# Patient Record
Sex: FEMALE | Race: BLACK OR AFRICAN AMERICAN | NOT HISPANIC OR LATINO | Employment: UNEMPLOYED | URBAN - METROPOLITAN AREA
[De-identification: names, ages, dates, MRNs, and addresses within clinical notes are randomized per-mention and may not be internally consistent; named-entity substitution may affect disease eponyms.]

---

## 2017-08-07 ENCOUNTER — OFFICE VISIT - HEALTHEAST (OUTPATIENT)
Dept: INTERNAL MEDICINE | Facility: CLINIC | Age: 22
End: 2017-08-07

## 2017-08-07 ENCOUNTER — COMMUNICATION - HEALTHEAST (OUTPATIENT)
Dept: INTERNAL MEDICINE | Facility: CLINIC | Age: 22
End: 2017-08-07

## 2017-08-07 ENCOUNTER — COMMUNICATION - HEALTHEAST (OUTPATIENT)
Dept: TELEHEALTH | Facility: CLINIC | Age: 22
End: 2017-08-07

## 2017-08-07 DIAGNOSIS — E55.9 VITAMIN D DEFICIENCY: ICD-10-CM

## 2017-08-07 DIAGNOSIS — E11.9 TYPE 2 DIABETES MELLITUS (H): ICD-10-CM

## 2017-08-07 DIAGNOSIS — Z00.00 ANNUAL PHYSICAL EXAM: ICD-10-CM

## 2017-08-07 DIAGNOSIS — M79.672 FOOT PAIN, LEFT: ICD-10-CM

## 2017-08-07 DIAGNOSIS — Z11.3 SCREEN FOR STD (SEXUALLY TRANSMITTED DISEASE): ICD-10-CM

## 2017-08-07 LAB — HBA1C MFR BLD: 8.8 % (ref 3.5–6)

## 2017-08-07 ASSESSMENT — MIFFLIN-ST. JEOR: SCORE: 1962.55

## 2017-08-08 ENCOUNTER — COMMUNICATION - HEALTHEAST (OUTPATIENT)
Dept: INTERNAL MEDICINE | Facility: CLINIC | Age: 22
End: 2017-08-08

## 2017-08-08 LAB — HIV 1+2 AB+HIV1 P24 AG SERPL QL IA: NEGATIVE

## 2017-08-09 ENCOUNTER — COMMUNICATION - HEALTHEAST (OUTPATIENT)
Dept: INTERNAL MEDICINE | Facility: CLINIC | Age: 22
End: 2017-08-09

## 2017-08-09 LAB
HBV SURFACE AG SERPL QL IA: NEGATIVE
HCV AB SERPL QL IA: NEGATIVE
SYPHILIS RPR SCREEN - HISTORICAL: NORMAL

## 2017-08-10 ENCOUNTER — COMMUNICATION - HEALTHEAST (OUTPATIENT)
Dept: INTERNAL MEDICINE | Facility: CLINIC | Age: 22
End: 2017-08-10

## 2017-08-11 ENCOUNTER — COMMUNICATION - HEALTHEAST (OUTPATIENT)
Dept: INTERNAL MEDICINE | Facility: CLINIC | Age: 22
End: 2017-08-11

## 2017-08-11 DIAGNOSIS — E11.9 TYPE 2 DIABETES MELLITUS (H): ICD-10-CM

## 2017-08-14 ENCOUNTER — OFFICE VISIT - HEALTHEAST (OUTPATIENT)
Dept: EDUCATION SERVICES | Facility: CLINIC | Age: 22
End: 2017-08-14

## 2017-08-14 DIAGNOSIS — E11.9 TYPE 2 DIABETES MELLITUS (H): ICD-10-CM

## 2017-08-18 ENCOUNTER — AMBULATORY - HEALTHEAST (OUTPATIENT)
Dept: LAB | Facility: CLINIC | Age: 22
End: 2017-08-18

## 2017-08-18 DIAGNOSIS — E11.9 TYPE 2 DIABETES MELLITUS (H): ICD-10-CM

## 2017-08-18 LAB
CHOLEST SERPL-MCNC: 222 MG/DL
FASTING STATUS PATIENT QL REPORTED: YES
HDLC SERPL-MCNC: 45 MG/DL
LDLC SERPL CALC-MCNC: 155 MG/DL
TRIGL SERPL-MCNC: 109 MG/DL

## 2017-09-07 ENCOUNTER — OFFICE VISIT - HEALTHEAST (OUTPATIENT)
Dept: PODIATRY | Facility: CLINIC | Age: 22
End: 2017-09-07

## 2017-09-07 DIAGNOSIS — M79.672 LEFT FOOT PAIN: ICD-10-CM

## 2018-01-09 ENCOUNTER — AMBULATORY - HEALTHEAST (OUTPATIENT)
Dept: NURSING | Facility: CLINIC | Age: 23
End: 2018-01-09

## 2018-01-09 ENCOUNTER — COMMUNICATION - HEALTHEAST (OUTPATIENT)
Dept: INTERNAL MEDICINE | Facility: CLINIC | Age: 23
End: 2018-01-09

## 2018-01-09 DIAGNOSIS — Z11.1 ENCOUNTER FOR TUBERCULIN SKIN TEST: ICD-10-CM

## 2018-01-11 ENCOUNTER — AMBULATORY - HEALTHEAST (OUTPATIENT)
Dept: NURSING | Facility: CLINIC | Age: 23
End: 2018-01-11

## 2018-01-11 DIAGNOSIS — Z00.00 HEALTH CARE MAINTENANCE: ICD-10-CM

## 2018-01-11 LAB
INDURATION - HISTORICAL: 0 MM
TB SKIN TEST - HISTORICAL: NEGATIVE

## 2018-09-05 ENCOUNTER — RECORDS - HEALTHEAST (OUTPATIENT)
Dept: ADMINISTRATIVE | Facility: OTHER | Age: 23
End: 2018-09-05

## 2018-10-09 ENCOUNTER — COMMUNICATION - HEALTHEAST (OUTPATIENT)
Dept: INTERNAL MEDICINE | Facility: CLINIC | Age: 23
End: 2018-10-09

## 2018-10-09 ENCOUNTER — COMMUNICATION - HEALTHEAST (OUTPATIENT)
Dept: NURSING | Facility: CLINIC | Age: 23
End: 2018-10-09

## 2018-10-10 ENCOUNTER — COMMUNICATION - HEALTHEAST (OUTPATIENT)
Dept: INTERNAL MEDICINE | Facility: CLINIC | Age: 23
End: 2018-10-10

## 2018-10-11 ENCOUNTER — COMMUNICATION - HEALTHEAST (OUTPATIENT)
Dept: INTERNAL MEDICINE | Facility: CLINIC | Age: 23
End: 2018-10-11

## 2018-10-18 ENCOUNTER — COMMUNICATION - HEALTHEAST (OUTPATIENT)
Dept: INTERNAL MEDICINE | Facility: CLINIC | Age: 23
End: 2018-10-18

## 2018-10-18 ENCOUNTER — PRENATAL OFFICE VISIT - HEALTHEAST (OUTPATIENT)
Dept: FAMILY MEDICINE | Facility: CLINIC | Age: 23
End: 2018-10-18

## 2018-10-18 DIAGNOSIS — N92.6 ABNORMAL MENSES: ICD-10-CM

## 2018-10-18 DIAGNOSIS — Z32.01 PREGNANCY TEST POSITIVE: ICD-10-CM

## 2018-10-18 DIAGNOSIS — E11.9 TYPE 2 DIABETES MELLITUS (H): ICD-10-CM

## 2018-10-18 DIAGNOSIS — Z34.00 NORMAL PREGNANCY, FIRST: ICD-10-CM

## 2018-10-18 DIAGNOSIS — E55.9 VITAMIN D DEFICIENCY: ICD-10-CM

## 2018-10-18 LAB
ALBUMIN UR-MCNC: NEGATIVE MG/DL
AMPHETAMINES UR QL SCN: NORMAL
BARBITURATES UR QL: NORMAL
BASOPHILS # BLD AUTO: 0 THOU/UL (ref 0–0.2)
BASOPHILS NFR BLD AUTO: 1 % (ref 0–2)
BENZODIAZ UR QL: NORMAL
CANNABINOIDS UR QL SCN: NORMAL
COCAINE UR QL: NORMAL
COLLECTION METHOD: NORMAL
CREAT UR-MCNC: 326 MG/DL
EOSINOPHIL # BLD AUTO: 0.1 THOU/UL (ref 0–0.4)
EOSINOPHIL NFR BLD AUTO: 1 % (ref 0–6)
ERYTHROCYTE [DISTWIDTH] IN BLOOD BY AUTOMATED COUNT: 12.6 % (ref 11–14.5)
GLUCOSE UR STRIP-MCNC: NEGATIVE MG/DL
HBA1C MFR BLD: 7.2 % (ref 3.5–6)
HCG UR QL: POSITIVE
HCT VFR BLD AUTO: 40.2 % (ref 35–47)
HGB BLD-MCNC: 12.5 G/DL (ref 12–16)
HIV 1+2 AB+HIV1 P24 AG SERPL QL IA: NEGATIVE
KETONES UR STRIP-MCNC: NEGATIVE MG/DL
LEAD BLD-MCNC: NORMAL UG/DL
LEAD RETEST: NO
LYMPHOCYTES # BLD AUTO: 2.3 THOU/UL (ref 0.8–4.4)
LYMPHOCYTES NFR BLD AUTO: 40 % (ref 20–40)
MCH RBC QN AUTO: 28.7 PG (ref 27–34)
MCHC RBC AUTO-ENTMCNC: 31.1 G/DL (ref 32–36)
MCV RBC AUTO: 92 FL (ref 80–100)
MONOCYTES # BLD AUTO: 0.7 THOU/UL (ref 0–0.9)
MONOCYTES NFR BLD AUTO: 12 % (ref 2–10)
NEUTROPHILS # BLD AUTO: 2.7 THOU/UL (ref 2–7.7)
NEUTROPHILS NFR BLD AUTO: 47 % (ref 50–70)
OPIATES UR QL SCN: NORMAL
OXYCODONE UR QL: NORMAL
PCP UR QL SCN: NORMAL
PLATELET # BLD AUTO: 310 THOU/UL (ref 140–440)
PMV BLD AUTO: 10.4 FL (ref 8.5–12.5)
RBC # BLD AUTO: 4.35 MILL/UL (ref 3.8–5.4)
WBC: 5.8 THOU/UL (ref 4–11)

## 2018-10-18 ASSESSMENT — MIFFLIN-ST. JEOR: SCORE: 1889.41

## 2018-10-19 ENCOUNTER — COMMUNICATION - HEALTHEAST (OUTPATIENT)
Dept: INTERNAL MEDICINE | Facility: CLINIC | Age: 23
End: 2018-10-19

## 2018-10-19 DIAGNOSIS — E11.9 DIABETES MELLITUS, TYPE 2 (H): ICD-10-CM

## 2018-10-19 LAB
25(OH)D3 SERPL-MCNC: 8.5 NG/ML (ref 30–80)
25(OH)D3 SERPL-MCNC: 8.5 NG/ML (ref 30–80)
ABO/RH(D): NORMAL
ABORH REPEAT: NORMAL
ANTIBODY SCREEN: NEGATIVE
BACTERIA SPEC CULT: NO GROWTH
HBV SURFACE AG SERPL QL IA: NEGATIVE
RUBV IGG SERPL QL IA: POSITIVE
T PALLIDUM AB SER QL: NEGATIVE

## 2018-10-20 LAB — LEAD BLDV-MCNC: <2 UG/DL (ref 0–4.9)

## 2018-10-21 ENCOUNTER — AMBULATORY - HEALTHEAST (OUTPATIENT)
Dept: FAMILY MEDICINE | Facility: CLINIC | Age: 23
End: 2018-10-21

## 2018-10-21 DIAGNOSIS — E55.9 VITAMIN D DEFICIENCY: ICD-10-CM

## 2018-11-01 ENCOUNTER — HOSPITAL ENCOUNTER (OUTPATIENT)
Dept: ULTRASOUND IMAGING | Facility: CLINIC | Age: 23
Discharge: HOME | End: 2018-11-01
Attending: PHYSICIAN ASSISTANT

## 2018-11-01 DIAGNOSIS — Z34.00 NORMAL PREGNANCY, FIRST: ICD-10-CM

## 2018-11-01 DIAGNOSIS — Z32.01 PREGNANCY TEST POSITIVE: ICD-10-CM

## 2018-11-30 ENCOUNTER — RECORDS - HEALTHEAST (OUTPATIENT)
Dept: ADMINISTRATIVE | Facility: OTHER | Age: 23
End: 2018-11-30

## 2018-12-07 ENCOUNTER — RECORDS - HEALTHEAST (OUTPATIENT)
Dept: ADMINISTRATIVE | Facility: OTHER | Age: 23
End: 2018-12-07

## 2018-12-07 ENCOUNTER — TRANSFERRED RECORDS (OUTPATIENT)
Dept: HEALTH INFORMATION MANAGEMENT | Facility: CLINIC | Age: 23
End: 2018-12-07

## 2018-12-19 ENCOUNTER — TRANSFERRED RECORDS (OUTPATIENT)
Dept: HEALTH INFORMATION MANAGEMENT | Facility: CLINIC | Age: 23
End: 2018-12-19

## 2018-12-19 ENCOUNTER — RECORDS - HEALTHEAST (OUTPATIENT)
Dept: ADMINISTRATIVE | Facility: OTHER | Age: 23
End: 2018-12-19

## 2018-12-20 ENCOUNTER — TELEPHONE (OUTPATIENT)
Dept: ENDOCRINOLOGY | Facility: CLINIC | Age: 23
End: 2018-12-20

## 2018-12-20 ENCOUNTER — COMMUNICATION - HEALTHEAST (OUTPATIENT)
Dept: ADMINISTRATIVE | Facility: CLINIC | Age: 23
End: 2018-12-20

## 2018-12-20 ENCOUNTER — PATIENT OUTREACH (OUTPATIENT)
Dept: EDUCATION SERVICES | Facility: CLINIC | Age: 23
End: 2018-12-20

## 2018-12-20 NOTE — TELEPHONE ENCOUNTER
Diabetes Education Scheduling Outreach #1:  Pt declined to schedule.    Tess Quispe  [facility]  Diabetes and Nutrition Scheduling

## 2018-12-20 NOTE — TELEPHONE ENCOUNTER
Diabetes Education Scheduling Outreach #1:    Call to patient to schedule. Left message with phone number to call to schedule.    Plan for 2nd outreach attempt within 1 business day.    [facility]  Diabetes and Nutrition Scheduling

## 2018-12-21 NOTE — PROGRESS NOTES
Pt called fvoc back and scheduled on 12/27 at 10:30 am next week with Christine.     Will close encounter.     Destiney Alvarez RD LD CDE

## 2018-12-27 ENCOUNTER — ALLIED HEALTH/NURSE VISIT (OUTPATIENT)
Dept: EDUCATION SERVICES | Facility: CLINIC | Age: 23
End: 2018-12-27
Attending: OBSTETRICS & GYNECOLOGY
Payer: COMMERCIAL

## 2018-12-27 DIAGNOSIS — O24.419 GESTATIONAL DIABETES: Primary | ICD-10-CM

## 2018-12-27 PROCEDURE — G0108 DIAB MANAGE TRN  PER INDIV: HCPCS | Performed by: NUTRITIONIST

## 2018-12-27 NOTE — PATIENT INSTRUCTIONS
Test your blood sugar four times per day.   Fasting (before breakfast) and two hours after each meal.    Glucose goals during pregnancy:  Fasting: less than 95  Two hours after eating: less than 120     Watch portion sizes of carbohydrate foods  Aim for 30-45 grams at breakfast  45-60 grams at lunch and dinner  15-30 grams at snacks    You can use [employer] (free Qu[employer]ium miesha) when eating out or look up the information online.     You will be getting a call from Endocrinology to set up an appointment.

## 2018-12-27 NOTE — PROGRESS NOTES
Diabetes Self-Management Education & Support  Gestational Diabetes Self-Management Education & Support    SUBJECTIVE/OBJECTIVE:  Presents for education related to pregnancy complicated by type 2 diabetes.  Referral is from Jacquelin Rizzo NP and Yennifer Nunez MD at [facility].   Referral is not in the medical record at this time.  Patient reports estimated date of delivery is June 12. This is her first pregnancy.  Patient was diagnosed with prediabetes in middle school and with type 2 diabetes in high school. At various times she took metformin but stopped by her own choice, states she doesn't like to take medications. Her most recent A1C was 7.2% on October 18th. She is not taking any medications at this time.  Patient lives with her mother, father, 4 and 10 year old siblings.  She is not currently checking her glucose. States she was checking twice daily and that most readings were in the 130-140 range. She was not aware that glucose goals change during pregnancy.  Patient has not checked her glucose in two weeks. She does have a new meter and supplies but did not bring them to the appointment today.   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 She is on winter break currently. She is willing to start testing four times daily.   Patient reports that she did see a DM educator for the first time last year at [facility].  Patient reports that her blood pressure has been elevated at doctor appointments but within goal when she checks at home. Her next follow up with her OB is on January 11.   Patients pre pregnancy weight was 245 lbs. Her weight has varied a bit during this pregnancy between 245 and 256 lbs.     Cultural Influences/Ethnic Background:  Data Unavailable    Estimated Date of Delivery: Data Unavailable    1 hour OGTT  No results found for: GLU1    3 hour OGTT    Fasting  No results found for:  GLF    1 hour  No results found for: GL1    2 hour  No results found for: GL2    3 hour  No results found for: GL3    Lifestyle and Health Behaviors:  Patient is receiving WIC.  For breakfast she has a bowl of cereal - mini wheat or frosted flakes or berry cheerios with 1% milk. Occasionally she will have eggs, sausage or yogurt  She has snacks after each meal. Snacks include fruit snacks, eating a lot of pickles recently (we talked about blood glucose and sodium today - patient will cut back), occassionally chips, guacamole and crackers. She is not eating sweets - has no appetite for them lately.  Lunch is usually leftovers. Dinner is spaghetti with meatballs or chicken with vegetables or salad with boiled egg or shrimp meagan. She rarely eats out.  Beverages include water and occasional juice (patient will cut back on juice portions)     ASSESSMENT:  Patient with type 2 diabetes and pregnancy. Needs to see Endocrinology and needs DM education.     INTERVENTION:    Educational topics covered today:  Difference between type 1 and type 2 and GDM, Risks and Complications of uncontrolled glucose, Means of controlling glucose during pregnancy, Purpose of glucose testing, how often and when to test glucose, Blood Glucose Goals, Logging and Interpreting Glucose Results, bring meter to all appointments, When to Call a Diabetes Educator or OB Provider, Healthy Eating During Pregnancy, Counting Carbohydrates, Portion sizes, Meal Planning    Educational materials provided today:   [facility] Understanding Gestational Diabetes  Log Book  Carbohydrate counting     Pt verbalized understanding of concepts discussed and recommendations provided today.     PLAN:  See AVS  Time Spent: 60 minutes  Encounter Type: Individual    Any diabetes medication dose changes were made via the CDE Protocol and Collaborative Practice Agreement with the patient's referring provider. A copy of this encounter was shared with the provider.

## 2019-01-01 ENCOUNTER — AMBULATORY - HEALTHEAST (OUTPATIENT)
Dept: MULTI SPECIALTY CLINIC | Facility: CLINIC | Age: 24
End: 2019-01-01

## 2019-01-01 LAB — PAP SMEAR - HIM PATIENT REPORTED: NORMAL

## 2019-01-04 NOTE — PROGRESS NOTES
LVM again for patient to c/b to schedule urgent GDM appt with consult service next week with Abdoulaye. Pt no-showed to her diabetes ed with Christine yesterday.

## 2019-01-17 ENCOUNTER — ALLIED HEALTH/NURSE VISIT (OUTPATIENT)
Dept: EDUCATION SERVICES | Facility: CLINIC | Age: 24
End: 2019-01-17
Payer: COMMERCIAL

## 2019-01-17 VITALS — WEIGHT: 262 LBS

## 2019-01-17 DIAGNOSIS — E11.9 TYPE 2 DIABETES MELLITUS (H): Primary | ICD-10-CM

## 2019-01-17 NOTE — PROGRESS NOTES
Diabetes Self-Management Education & Support  Gestational Diabetes Self-Management Education & Support    SUBJECTIVE/OBJECTIVE:  Presents for education related to pregnancy complicated by type 2 diabetes.  Patient rescheduled her appointment for last week so was last seen two weeks ago.  She had not followed up with our Endocrinology coordinator regarding scheduling an appointment with the endocrinologist until this morning and now has an appointment scheduled with Dr. Heena Mesa.     Cultural Influences/Ethnic Background:  American    Wt 118.8 kg (262 lb)     Weight gain 6 lbs at 19 weeks gestation.    Estimated Date of Delivery: Data Unavailable    Blood Glucose/Ketone Log: Patient did not bring her meter to our last appointment and again did not bring her meter to this follow up today. States she was just coming from school and forgot it.  Is back in school now 8-4:30pm and reports that she has been able to test her glucose four times per day fasting and one hour after meals. Her fasting glucose per her report are ranging from 110-115 and her post prandial readings are all under 140. Reinforced the importance of bringing her meter to all appointments.     Lifestyle and Health Behaviors:  Patient lives with her parents and siblings. Her dad has type 2 diabetes.  She has cereal for breakfast (is portioning this out and hasn't had a problem with high glucose after). She will occasionally have a piece of fruit with that  Lunch has been leftovers. She has been packing it with her to bring to school.   Dinner is meat, vegetable and a portion of rice or chili with crackers. She's been craving ramen noodles lately so has been eating that as well (52 grams CHO, discussed adding protein and vegetable and limiting sodium packet)  Snacks include [place] granola bar or a yogurt.   She drinks mostly water and sometimes a little sweet tea - states not much because she knows it has sugar.  She feels the adjustments to  her diet are going well so far.      ASSESSMENT:  Unable to assess glucose readings as patient did not bring her meter, however, her reported fasting glucose readings are indicating that she will need insulin during this pregnancy.     INTERVENTION:  Educational topics covered today:  Reviewed meal planning.  Insulin injection technique taught using a Pen for NPH insulin. Patient verbalized understanding and was able to perform an accurate return demonstration of injection technique.  Discussed mixing insulin, storage, sharps, new needle each use, site selection, action of insulin and hypoglycemia signs, symptoms and treatment.    Ketone testing    PLAN:  Check glucose 4 times daily.  Check ketones once a week when readings are consistently negative.  Continue with recommended physical activity.  Continue to follow recommended meal plan.  See AVS for further Plan.    Call/e-mail/MyChart message diabetes educator if 3 or more blood sugars are above the goal in 1 week or if ketones are positive.    Time Spent: 60 minutes  Encounter Type: Individual    Any diabetes medication dose changes were made via the CDE Protocol and Collaborative Practice Agreement with the patient's referring provider. A copy of this encounter was shared with the provider.

## 2019-01-17 NOTE — PATIENT INSTRUCTIONS
"Taking Insulin:    1. Insulin dose will be determined by your doctor.    - Do a 2 unit \"prime\" before each injection, be sure a stream of insulin comes out of the needle before you give your injection.    - After you inject, hold the needle under the skin to the count of 10 to be sure all of the insulin goes in.     - Rotate injection sites, keeping at least 1 inch apart from last injection site and 2 inches away from belly button or surgical scars.    2. Pen - Use a new pen needle for each injection. Always remove pen needle from the insulin pen after use and do not store insulin pens with the needle on the pen.     3. Store insulin you are not using in the refrigerator (do not freeze). Take new insulin out of the refrigerator a few hours prior to use to bring to room temperature.     4. Once opened NPH Pens (Humulin N or Novolin N) can be kept at room temperature for 14 days after opened. Do not use the opened insulin after this time has passed, even if there is still medicine inside.     5. Always carry your blood sugar meter and a sugar source like glucose tablets with you in case of a low blood sugar. Treat a low blood sugar (less than 70) with 15 grams of carbohydrate (1 carb choice). Wait 15 minutes, recheck blood sugar. If blood sugar is still below 70, repeat the treatment.    6. Wear Medical ID or carry a wallet card stating you have Diabetes.    7. Call your doctor or diabetes educator if you begin having low blood sugars or if you have questions or concerns.     8. Follow-up: With the Endocrinologist on Wednesday. Be sure to bring your meter to that appointment! Appointment with me on 2/14 at 2:30pm.    Thank you! Here is a link to a video where you can watch insulin injections technique. [email or web address]    If you need a medication refill please contact your pharmacy. Please allow 3 business days for your refills to be completed.   "

## 2019-01-22 ENCOUNTER — DOCUMENTATION ONLY (OUTPATIENT)
Dept: CARE COORDINATION | Facility: CLINIC | Age: 24
End: 2019-01-22

## 2019-01-23 ENCOUNTER — OFFICE VISIT (OUTPATIENT)
Dept: ENDOCRINOLOGY | Facility: CLINIC | Age: 24
End: 2019-01-23
Payer: COMMERCIAL

## 2019-01-23 ENCOUNTER — TELEPHONE (OUTPATIENT)
Dept: ENDOCRINOLOGY | Facility: CLINIC | Age: 24
End: 2019-01-23

## 2019-01-23 ENCOUNTER — APPOINTMENT (OUTPATIENT)
Dept: LAB | Facility: CLINIC | Age: 24
End: 2019-01-23
Payer: COMMERCIAL

## 2019-01-23 VITALS
DIASTOLIC BLOOD PRESSURE: 79 MMHG | SYSTOLIC BLOOD PRESSURE: 124 MMHG | HEIGHT: 67 IN | BODY MASS INDEX: 41.4 KG/M2 | HEART RATE: 94 BPM | WEIGHT: 263.8 LBS

## 2019-01-23 DIAGNOSIS — E56.9 VITAMIN DEFICIENCY: Primary | ICD-10-CM

## 2019-01-23 DIAGNOSIS — E56.9 VITAMIN DEFICIENCY: ICD-10-CM

## 2019-01-23 DIAGNOSIS — O24.112 TYPE 2 DIABETES MELLITUS DURING PREGNANCY, SECOND TRIMESTER: ICD-10-CM

## 2019-01-23 DIAGNOSIS — E11.9 TYPE 2 DIABETES MELLITUS (H): ICD-10-CM

## 2019-01-23 LAB — HBA1C MFR BLD: 7 % (ref 4.3–6)

## 2019-01-23 RX ORDER — METFORMIN HCL 500 MG
TABLET, EXTENDED RELEASE 24 HR ORAL
Qty: 90 TABLET | Refills: 11 | Status: SHIPPED | OUTPATIENT
Start: 2019-01-23 | End: 2019-01-23

## 2019-01-23 RX ORDER — URINE ACETONE TEST STRIPS
STRIP MISCELLANEOUS
Qty: 1 EACH | Refills: 11 | Status: SHIPPED | OUTPATIENT
Start: 2019-01-23 | End: 2023-09-18

## 2019-01-23 RX ORDER — METFORMIN HCL 500 MG
TABLET, EXTENDED RELEASE 24 HR ORAL
Qty: 90 TABLET | Refills: 11 | Status: SHIPPED | OUTPATIENT
Start: 2019-01-23 | End: 2023-01-11

## 2019-01-23 RX ORDER — FOLIC ACID 1 MG/1
TABLET ORAL
COMMUNITY
Start: 2018-12-04 | End: 2023-10-30

## 2019-01-23 ASSESSMENT — MIFFLIN-ST. JEOR: SCORE: 1989.22

## 2019-01-23 ASSESSMENT — PAIN SCALES - GENERAL: PAINLEVEL: NO PAIN (0)

## 2019-01-23 NOTE — PATIENT INSTRUCTIONS
PLEASE GO TO LAB TODAY FOR VITAMIN D LEVEL    Please start taking vitamin D 2,000 international unit(s) daily in addition to your prenatal vitamin    During pregnancy:  Please check your blood sugars when you are fasting in the morning, and 1-2 hours after meals and before meals and log carefully into a notebook and bring the notebook to all of your visits.    During pregnancy:  Fasting blood sugar goals are <100  1 hours after eating blood sugar goals are: <140  2 hours after eating blood sugar goals are: <120    Please restart your metformin XR. Start 1 tab 1st week, then increase to 1 tab twice daily 2nd week, then increase to 2 tabs twice daily 3rd week.    Since your fasting blood sugars are higher than 100, you will need to start long acting basal insulin. Start glargine (lantus) insulin 10 units at bedtime and increase glargine (lantus/basal/long-acting insulin) by 5 units twice weekly until fasting blood sugars are <100. PLEASE ASSESS THE NEED TO INCREASE GLARGINE INSULIN AFTER YOUR METFORMIN IS AT THE MAX DOSE.    If your fasting blood sugars are <100 and if your blood sugars after eating are higher than the range (1 hour after eating >140 and 2 hours after eating >120), then you will need to add meal time insulin or novolog insulin starting with 1 unit per 1 unit of carbohydrates eaten. It is ok to wait up to 10-15 minutes after you have eaten to see how many carbohydrates you have eaten before dosing your meal time or humalog insulin. PLEASE ASSESS THE NEED TO INCREASE GLARGINE INSULIN AFTER YOUR METFORMIN IS AT THE MAX DOSE.    FOR FUTURE, IF NEEDED:  If your sugars are out of the range despite the above, start novolog insulin correction scale before meals:  Blood sugar less than 100 no insulin correction or bedtime insulin  Blood sugar 100 to 150, take 1 unit  Blood sugar 151 to 200, take 2 units  Blood sugar 201 to 250, take 3 units  Blood sugar 251 to 300, take 4 units  Blood sugar 301 to 350, take 5  units  Blood sugar 351-400, take 6 units     For questions about blood sugars, please call [phone] anytime & ask the  to page diabetes/endocrine on-call.

## 2019-01-23 NOTE — TELEPHONE ENCOUNTER
[facility]    Phone Message    May a detailed message be left on voicemail: yes    Reason for Call: Medication Question or concern regarding medication   Prescription Clarification  Name of Medication: metFORMIN (GLUCOPHAGE-XR) 500 MG 24 hr tablet  Prescribing Provider: Moshe    Pharmacy: [facility] - [place], [place] - [address] [place] [address]   What on the order needs clarification? Nicholas aragon pharmacist from [facility] called and would like to get clarification on the medication directions. Please call back Nicholas barr. Thanks.          Action Taken: Message routed to:  [facility] ([facility]): Felicity/Ping

## 2019-01-23 NOTE — PROGRESS NOTES
"Reason for visit/consult: T2DM during pregnancy    Primary care provider: No primary care provider on file.    HPI:  This is a 22 yo female  with Type 2 Diabetes Mellitus who is seen for evaluation and management of diabetes during 2nd trimester of pregnancy. She has had T2DM since 2016 and does not have any known complications. She was seen in ER at [facility] on 18 for pregnancy related nausea and vomiting and was given reglan. This has since resolved. She is taking not metformin or insulin or anything for her diabetes and her blood sugars are elevated. She has seen diabetes education last fall (see note in Care Everywhere). She is feeling ok today without complaints. Last A1c was 7.2% on 10/18/18. A1c today is 7.0 %. She saw diabetes education <1 week ago (see note from RD dated ) and reported her blood sugars were in target range. Insulin injection technique was taught at that visit.    LMP was 18 with recent US showing \"Single living intrauterine pregnancy at 7 weeks 3 days and EDC of 2019.\" She sees Jacquelin Rizzo NP and Yennifer Nunez MD at [facility]. Due date is 19. Patients pre pregnancy weight was 245 lbs and BMI was 40 prior to pregnancy. Her weight has varied a bit during this pregnancy between 245 and 256 lbs. She was on metformin XR prior to pregnancy.    Meter download data is as follows: 1/10-19  Highest: 536 (not hers - her stepdad used her meter from 1/10-19, then she began using the meter alone on 19)  Median: 260   Std. Dev.: 109   Lowest: 111   # tests: 25   % hypoglycemic: 0   % above target: 18     Past Medical/Surgical History:  -Type 2 DM 2016  -BUNIONECTOMY , 2015   x2     Allergies:  NKDA    Current Medications:  Prenatal vitamin    Family History:  No thyroid or adrenal  Step dad has T2DM and alcohol abuse, Mom used to have PCOS    Social History:   Single. Lives at home with mother, father, 4 and 10 year old siblings. 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 Denies drugs, alcohol, or tobacco.    ROS:  Negative     Exam  /79   Pulse 94   Ht 1.702 m (5' 7\")   Wt 119.7 kg (263 lb 12.8 oz)   BMI 41.32 kg/m    Gen: well appearing, nad, pleasant and conversant  HEENT: anicteric, EOMI, no proptosis or lid lag, no goiter or LAD    Labs/Imaging    Reviewed and remarkable for normal renal function and absence of anemia.  A1C 8.8 (H) 08/07/2017  A1C in 2015 was 7.0  Vitamin D was 8.5 on 10/19/18  TSH 2.46 on 8/8/17    Assessment and Plan    Type 2 DM with BMI 41 in 2nd trimester pregnancy, not on metFORMIN. Blood sugars are not in target range, therefore needs to restart metformin and insulin is recommended.    Continue diabetes education for information about CHO counting to prepare patient for insulin start later in pregnancy.    Vitamin D deficiency - was taking 50,000 international unit(s) weekly, now on nothing, except prenatal vitamin.    Labs today: vitamin D level    Discuss isocaloric diet in 3rd trimester at future visit after glucoses stablized.    Routine foot exam needed at future visit.    Patient Instructions:    PLEASE GO TO LAB TODAY FOR VITAMIN D LEVEL    Please start taking vitamin D 2,000 international unit(s) daily in addition to your prenatal vitamin    During pregnancy:  Please check your blood sugars when you are fasting in the morning, and 1-2 hours after meals and before meals and log carefully into a notebook and bring the notebook to all of your visits.    During pregnancy:  Fasting blood sugar goals are <100  1 hours after eating blood sugar goals are: <140  2 hours after eating blood sugar goals are: <120    Please restart your metformin XR. Start 1 tab 1st week, then increase to 1 tab twice daily 2nd week, then increase to 2 tabs twice daily 3rd week.    Since your fasting blood sugars are higher than 100, you will need to start long acting basal insulin. Start glargine (lantus) insulin " 10 units at bedtime and increase glargine (lantus/basal/long-acting insulin) by 5 units twice weekly until fasting blood sugars are <100. PLEASE ASSESS THE NEED TO INCREASE GLARGINE INSULIN AFTER YOUR METFORMIN IS AT THE MAX DOSE.    If your fasting blood sugars are <100 and if your blood sugars after eating are higher than the range (1 hour after eating >140 and 2 hours after eating >120), then you will need to add meal time insulin or novolog insulin starting with 1 unit per 1 unit of carbohydrates eaten. It is ok to wait up to 10-15 minutes after you have eaten to see how many carbohydrates you have eaten before dosing your meal time or humalog insulin. PLEASE ASSESS THE NEED TO INCREASE GLARGINE INSULIN AFTER YOUR METFORMIN IS AT THE MAX DOSE.    FOR FUTURE, IF NEEDED:  If your sugars are out of the range despite the above, start novolog insulin correction scale before meals:  Blood sugar less than 100 no insulin correction or bedtime insulin  Blood sugar 100 to 150, take 1 unit  Blood sugar 151 to 200, take 2 units  Blood sugar 201 to 250, take 3 units  Blood sugar 251 to 300, take 4 units  Blood sugar 301 to 350, take 5 units  Blood sugar 351-400, take 6 units     For questions about blood sugars, please call [phone] anytime & ask the  to page diabetes/endocrine on-call.    RTC: DIABETES EDUCATION on 2/14 & monthly with diabetes team member PA, 3 months endocrinologist    Heena Mesa MD, MPH  Attending Physician  Diabetes/Endocrinology/Metabolism    Time: 60 min spent on evaluation, management, counseling, education, & motivational interviewing with greater than 50% of the total time was spent on counseling and coordinating care

## 2019-01-23 NOTE — LETTER
"2019       RE: Ava Garcia  [address]  [place] [address]     Dear Colleague,    Thank you for referring your patient, Ava Garcia, to the [facility] ENDOCRINOLOGY at [facility]. Please see a copy of my visit note below.    Reason for visit/consult: T2DM during pregnancy    Primary care provider: No primary care provider on file.    HPI:  This is a 24 yo female  with Type 2 Diabetes Mellitus who is seen for evaluation and management of diabetes during 2nd trimester of pregnancy. She has had T2DM since 2016 and does not have any known complications. She was seen in ER at [facility] on 18 for pregnancy related nausea and vomiting and was given reglan. This has since resolved. She is taking not metformin or insulin or anything for her diabetes and her blood sugars are elevated. She has seen diabetes education last  (see note in Care Everywhere). She is feeling ok today without complaints. Last A1c was 7.2% on 10/18/18. A1c today is 7.0 %. She saw diabetes education <1 week ago (see note from RD dated ) and reported her blood sugars were in target range. Insulin injection technique was taught at that visit.    LMP was 18 with recent US showing \"Single living intrauterine pregnancy at 7 weeks 3 days and EDC of 2019.\" She sees Jacquelin Rizzo NP and Yennifer Nunez MD at [facility]. Due date is 19. Patients pre pregnancy weight was 245 lbs and BMI was 40 prior to pregnancy. Her weight has varied a bit during this pregnancy between 245 and 256 lbs. She was on metformin XR prior to pregnancy.    Meter download data is as follows: 1/10-19  Highest: 536 (not hers - her stepdad used her meter from 1/10-19, then she began using the meter alone on 19)  Median: 260   Std. Dev.: 109   Lowest: 111   # tests: 25   % hypoglycemic: 0   % above target: 18     Past Medical/Surgical History:  -Type 2 DM " "2016  -BUNIONECTOMY 2014, 2015   x2     Allergies:  NKDA    Current Medications:  Prenatal vitamin    Family History:  No thyroid or adrenal  Step dad has T2DM and alcohol abuse, Mom used to have PCOS    Social History:   Single. Lives at home with mother, father, 4 and 10 year old siblings. Attends [employer] for cos[employer]ology and working at [employer]. Denies drugs, alcohol, or tobacco.    ROS:  Negative     Exam  /79   Pulse 94   Ht 1.702 m (5' 7\")   Wt 119.7 kg (263 lb 12.8 oz)   BMI 41.32 kg/m     Gen: well appearing, nad, pleasant and conversant  HEENT: anicteric, EOMI, no proptosis or lid lag, no goiter or LAD    Labs/Imaging    Reviewed and remarkable for normal renal function and absence of anemia.  A1C 8.8 (H) 08/07/2017  A1C in 2015 was 7.0  Vitamin D was 8.5 on 10/19/18  TSH 2.46 on 8/8/17    Assessment and Plan    Type 2 DM with BMI 41 in 2nd trimester pregnancy, not on metFORMIN. Blood sugars are not in target range, therefore needs to restart metformin and insulin is recommended.    Continue diabetes education for information about CHO counting to prepare patient for insulin start later in pregnancy.    Vitamin D deficiency - was taking 50,000 international unit(s) weekly, now on nothing, except prenatal vitamin.    Labs today: vitamin D level    Discuss isocaloric diet in 3rd trimester at future visit after glucoses stablized.    Routine foot exam needed at future visit.    Patient Instructions:    PLEASE GO TO LAB TODAY FOR VITAMIN D LEVEL    Please start taking vitamin D 2,000 international unit(s) daily in addition to your prenatal vitamin    During pregnancy:  Please check your blood sugars when you are fasting in the morning, and 1-2 hours after meals and before meals and log carefully into a notebook and bring the notebook to all of your visits.    During pregnancy:  Fasting blood sugar goals are <100  1 hours after eating blood sugar goals are: <140  2 hours after eating blood " sugar goals are: <120    Please restart your metformin XR. Start 1 tab 1st week, then increase to 1 tab twice daily 2nd week, then increase to 2 tabs twice daily 3rd week.    Since your fasting blood sugars are higher than 100, you will need to start long acting basal insulin. Start glargine (lantus) insulin 10 units at bedtime and increase glargine (lantus/basal/long-acting insulin) by 5 units twice weekly until fasting blood sugars are <100. PLEASE ASSESS THE NEED TO INCREASE GLARGINE INSULIN AFTER YOUR METFORMIN IS AT THE MAX DOSE.    If your fasting blood sugars are <100 and if your blood sugars after eating are higher than the range (1 hour after eating >140 and 2 hours after eating >120), then you will need to add meal time insulin or novolog insulin starting with 1 unit per 1 unit of carbohydrates eaten. It is ok to wait up to 10-15 minutes after you have eaten to see how many carbohydrates you have eaten before dosing your meal time or humalog insulin. PLEASE ASSESS THE NEED TO INCREASE GLARGINE INSULIN AFTER YOUR METFORMIN IS AT THE MAX DOSE.    FOR FUTURE, IF NEEDED:  If your sugars are out of the range despite the above, start novolog insulin correction scale before meals:  Blood sugar less than 100 no insulin correction or bedtime insulin  Blood sugar 100 to 150, take 1 unit  Blood sugar 151 to 200, take 2 units  Blood sugar 201 to 250, take 3 units  Blood sugar 251 to 300, take 4 units  Blood sugar 301 to 350, take 5 units  Blood sugar 351-400, take 6 units     For questions about blood sugars, please call [phone] anytime & ask the  to page diabetes/endocrine on-call.    RTC: DIABETES EDUCATION on 2/14 & monthly with diabetes team member PA, 3 months endocrinologist    Heena Mesa MD, MPH  Attending Physician  Diabetes/Endocrinology/Metabolism    Time: 60 min spent on evaluation, management, counseling, education, & motivational interviewing with greater than 50% of the total  time was spent on counseling and coordinating care          Again, thank you for allowing me to participate in the care of your patient.      Sincerely,    Heena Mesa MD

## 2019-01-23 NOTE — LETTER
Date:January 24, 2019      Patient was self referred, no letter generated. Do not send.        [facility] Health Information

## 2019-01-24 LAB — DEPRECATED CALCIDIOL+CALCIFEROL SERPL-MC: 17 UG/L (ref 20–75)

## 2019-01-24 NOTE — TELEPHONE ENCOUNTER
Pharmacy Contact     Telephone Fax   [phone]   Pharmacy Address and Hours     Address Hours   [address]  [address]  [place] [address]      [facility] - [place], [place] - [address]     Spoke w/ Pharm D Martin regarding order for Metformin and orders to titrate dosage over 1-3 weeks related to possible GI upset and Pt tolerance.     metFORMIN (GLUCOPHAGE-XR) 500 MG 24 hr tablet 90 tablet 11 2019  No   Si tab daily x 1 week, then increase to 2 tabs  x 1 week, then increase to 2 tabs twice daily   Sent to pharmacy as: metFORMIN (GLUCOPHAGE-XR) 500 MG 24 hr tablet   Class: E-Prescribe   Order: [id number]   E-Prescribing Status: Receipt confirmed by pharmacy (2019  4:58 PM CST)

## 2019-01-24 NOTE — TELEPHONE ENCOUNTER
[facility] Call Center    Phone Message    May a detailed message be left on voicemail: yes    Reason for Call: Medication Question or concern regarding medication   Prescription Clarification  Name of Medication: Metformin  Prescribing Provider: Heena Mesa   Pharmacy: [facility] - [place], [place] - [address]   What on the order needs clarification? Pharmacy still needs clarification on dosing. Please CALL pharmacy to discuss.           Action Taken: Message routed to:  [facility] ([facility] Endo

## 2019-02-01 ENCOUNTER — TELEPHONE (OUTPATIENT)
Dept: ENDOCRINOLOGY | Facility: CLINIC | Age: 24
End: 2019-02-01

## 2019-02-01 NOTE — TELEPHONE ENCOUNTER
FRANCES called asking Question:  Has orders for metformin and insluin and hasn't taken anything yet, has just seen GYN Dr Yennifer Nunez and A1c hasn't really improved, fasting at 140 - type 2 - Dr is wondering how to facilitate helping this Pt - [phone]. States Pt doesn't really understand how to take medication. Sent to Dr Mesa for clarification.

## 2019-02-01 NOTE — TELEPHONE ENCOUNTER
[facility] Call Center    Phone Message    May a detailed message be left on voicemail: yes    Reason for Call: Other: Dr. Yennifer Nunez is requesting a call from either Dr. Mesa or a nurse in the clinic to discuss the pt's recent office visit. Please call her at [phone]. She is also requesting that office notes be faxed to [phone]. Thanks!     Action Taken: Message routed to:  [facility] ([facility] Endocrine

## 2019-02-04 ENCOUNTER — AMBULATORY - HEALTHEAST (OUTPATIENT)
Dept: MATERNAL FETAL MEDICINE | Facility: HOSPITAL | Age: 24
End: 2019-02-04

## 2019-02-04 ENCOUNTER — AMBULATORY - HEALTHEAST (OUTPATIENT)
Dept: ADMINISTRATIVE | Facility: CLINIC | Age: 24
End: 2019-02-04

## 2019-02-04 DIAGNOSIS — E11.9 DM2 (DIABETES MELLITUS, TYPE 2) (H): ICD-10-CM

## 2019-02-04 DIAGNOSIS — O26.90 PREGNANCY, ANTEPARTUM, COMPLICATIONS: ICD-10-CM

## 2019-02-11 DIAGNOSIS — O24.112 PRE-EXISTING TYPE 2 DIABETES MELLITUS DURING PREGNANCY IN SECOND TRIMESTER: Primary | ICD-10-CM

## 2019-02-12 ENCOUNTER — AMBULATORY - HEALTHEAST (OUTPATIENT)
Dept: MATERNAL FETAL MEDICINE | Facility: HOSPITAL | Age: 24
End: 2019-02-12

## 2019-02-14 ENCOUNTER — AMBULATORY - HEALTHEAST (OUTPATIENT)
Dept: CARDIOLOGY | Facility: HOSPITAL | Age: 24
End: 2019-02-14

## 2019-02-14 ENCOUNTER — OFFICE VISIT - HEALTHEAST (OUTPATIENT)
Dept: PODIATRY | Facility: CLINIC | Age: 24
End: 2019-02-14

## 2019-02-14 ENCOUNTER — AMBULATORY - HEALTHEAST (OUTPATIENT)
Dept: PODIATRY | Facility: CLINIC | Age: 24
End: 2019-02-14

## 2019-02-14 DIAGNOSIS — E11.9 TYPE 2 DIABETES MELLITUS WITHOUT COMPLICATION, WITHOUT LONG-TERM CURRENT USE OF INSULIN (H): ICD-10-CM

## 2019-02-14 DIAGNOSIS — R60.0 EDEMA OF LEFT FOOT: ICD-10-CM

## 2019-02-14 DIAGNOSIS — E11.9 DM2 (DIABETES MELLITUS, TYPE 2) (H): ICD-10-CM

## 2019-02-14 ASSESSMENT — MIFFLIN-ST. JEOR: SCORE: 1984.66

## 2019-02-15 ENCOUNTER — HEALTH MAINTENANCE LETTER (OUTPATIENT)
Age: 24
End: 2019-02-15

## 2019-02-18 ENCOUNTER — HOSPITAL ENCOUNTER (OUTPATIENT)
Dept: CARDIOLOGY | Facility: HOSPITAL | Age: 24
Discharge: HOME | End: 2019-02-18
Attending: OBSTETRICS & GYNECOLOGY

## 2019-02-18 DIAGNOSIS — E11.9 DM2 (DIABETES MELLITUS, TYPE 2) (H): ICD-10-CM

## 2019-02-18 LAB
ATRIAL RATE - MUSE: 86 BPM
DIASTOLIC BLOOD PRESSURE - MUSE: NORMAL MMHG
INTERPRETATION ECG - MUSE: NORMAL
P AXIS - MUSE: 43 DEGREES
PR INTERVAL - MUSE: 132 MS
QRS DURATION - MUSE: 80 MS
QT - MUSE: 364 MS
QTC - MUSE: 435 MS
R AXIS - MUSE: 52 DEGREES
SYSTOLIC BLOOD PRESSURE - MUSE: NORMAL MMHG
T AXIS - MUSE: 12 DEGREES
VENTRICULAR RATE- MUSE: 86 BPM

## 2019-02-19 ENCOUNTER — ALLIED HEALTH/NURSE VISIT (OUTPATIENT)
Dept: EDUCATION SERVICES | Facility: CLINIC | Age: 24
End: 2019-02-19
Payer: COMMERCIAL

## 2019-02-19 DIAGNOSIS — E56.9 VITAMIN DEFICIENCY: ICD-10-CM

## 2019-02-19 DIAGNOSIS — O24.419 GESTATIONAL DIABETES: Primary | ICD-10-CM

## 2019-02-20 VITALS — WEIGHT: 259.6 LBS | BODY MASS INDEX: 40.66 KG/M2

## 2019-02-20 NOTE — PROGRESS NOTES
"Diabetes Self-Management Education & Support  Gestational Diabetes Self-Management Education & Support    SUBJECTIVE/OBJECTIVE:  Presents for education related to gestational diabetes.  Currently at 23 weeks gestation.   Cultural Influences/Ethnic Background:  American  In school fulltime for cosmetology.      There were no vitals taken for this visit.    Weight gain 4 lbs at 23 weeks gestation.  She has lost 3 lbs since her last visit with a diabetes educator (one month ago).      Estimated Date of Delivery: June 17, 2019    Blood Glucose/Ketone Log: No blood glucoses available today because she did not bring her meter in with her.      Lifestyle and Health Behaviors:    States she is trying to follow dietary guidelines set out by MIGUEL ANGEL Aguilar at their last visit, but it is clear from talking to her that she isn't really counting carbs.      Current Management:  Taking 10 units of Lantus at bedtime.  She has not increased the dose per recommendations of Dr. Mesa.      ASSESSMENT:  Ketones: Hasn't been checking.   States that fasting blood glucoses are ranging between .  She thinks that her two post post prandial blood glucoses are in target range, but we checked her today about an hour after eating a \"honey bun\" and BG was 176.        INTERVENTION:  Educational topics covered today:  What to expect after delivery, Risk of GDM and planning ahead for future pregnancies, Recommended lifestyle interventions for reducing the risk of Type 2 Diabetes.    We discussed how to titrate up her dose of Lantus insulin, and discussed that she will more than likely require mealtime insulin as well.  We have no real blood glucoses to base any dosing on, however, so we did not add it today.  Discussed how to administer, take about 15 minutes before eating, need to count carbohydrates accurately.  Helped her download the [employer] miesha on her phone and gave her a new meter that will work with it.  Also helped her download " the [employer] phone miesha as well.        PLAN:  Check glucose 4 times daily.  Bring glucose meter with her to all diabetes-related appointments.    Check ketones once a week when readings are consistently negative.  Call if trace or higher for 2 days in a row.   Continue with recommended physical activity.  Continue to follow recommended meal plan: 30 carbs at breakfast, 45-60 carbs at lunch, 45-60 carbs at supper,  15-30 grams CHO for snacks, if desired, however this is not mandatory.   Follow consistent CHO meal plan, eat CHO and protein/fat at all meals/snacks.    Call/e-mail/MyChart message diabetes educator weekly until delivery.  Discussed in detail how she can do this:  Either email or MyChart message would be sufficient.      Time Spent: 60 minutes  Encounter Type: Individual    Any diabetes medication dose changes were made via the CDE Protocol and Collaborative Practice Agreement with the patient's referring provider. A copy of this encounter was shared with the provider.

## 2019-02-28 ENCOUNTER — DOCUMENTATION ONLY (OUTPATIENT)
Dept: CARE COORDINATION | Facility: CLINIC | Age: 24
End: 2019-02-28

## 2019-03-01 ENCOUNTER — PRE VISIT (OUTPATIENT)
Dept: MATERNAL FETAL MEDICINE | Facility: CLINIC | Age: 24
End: 2019-03-01

## 2019-03-01 ENCOUNTER — TRANSFERRED RECORDS (OUTPATIENT)
Dept: HEALTH INFORMATION MANAGEMENT | Facility: CLINIC | Age: 24
End: 2019-03-01

## 2019-03-01 ENCOUNTER — AMBULATORY - HEALTHEAST (OUTPATIENT)
Dept: MULTI SPECIALTY CLINIC | Facility: CLINIC | Age: 24
End: 2019-03-01

## 2019-03-01 LAB — RETINOPATHY: NORMAL

## 2019-03-01 NOTE — PROGRESS NOTES
RE: Ava Garcia  : 1995  MRUN: [id number]    2019    Dear Dr. Nunez,    Thank you for referring your patient Ms. Garcia for a Maternal-Fetal Medicine consultation today.  As you know, she is a 23 year old  at 25 weeks and 6 days gestation with an MARIA ANTONIA of 19 by LMP of 18.    She came to me today to discuss recommendations as she has pregestational Type II DM that has been poorly controlled.  She was initially diagnosed in  and reported that she had started on Metformin, but then discontinued medications until her current pregnancy. She was on Metformin at the beginning of the pregnancy, but due to fasting glucose elevations was switched to insulin. She last met with Endocrinology  and has been followed by Diabetic Education - her last visit in the chart is from , however she says that she was seen last week as well. She has currently been instructed to take 20u Lantus at bedtime and 5u Novalog with meals.  She did not have her meter with her - says she has been checking FS QID, but has not had values at her past visits with Diabetic educator either.  Her A1c was 7.2 on 10/18, most recently 7.0 on .    She has had a normal EKG. Has a normal fetal echo and ultrasound today (see imaging team).    Today she feels well.  She denies cramping, loss of fluid or bleeding. Is feeling baby move. Denies si/syx of preeclampsia (no headaches, RUQ pain, visual changes).     Problems Complicating Pregnancy:  Pregestational Diabetes  ?underlying CHTN    Obstetrical History:    Obstetric History       T0      L0     SAB0   TAB0   Ectopic0   Multiple0   Live Births0       # Outcome Date GA Lbr Jorge/2nd Weight Sex Delivery Anes PTL Lv   1 Current                   Medical History:   Type II DM  Question of chronic hypertension (mildly elevated BPs 140s/80 in MD office early in pregnancy, wnl at further prenatal visits)    Surgical History:   Bunionectomy ,  2015x2    Medications:     Current Outpatient Medications:      folic acid (FOLVITE) 1 MG tablet, , Disp: , Rfl:      insulin glargine (LANTUS SOLOSTAR PEN) 100 UNIT/ML pen, 15 units at bedtime to start with metformin, Disp: 15 mL, Rfl: 11     insulin pen needle (31G X 5 MM) 31G X 5 MM miscellaneous, Use 5 pen needles daily or as directed., Disp: 200 each, Rfl: 11     IRON PO, Take 1 tablet by mouth, Disp: , Rfl:      KETOSTIX test strip, Use as directed by diabetes education, Disp: 1 each, Rfl: 11     metFORMIN (GLUCOPHAGE-XR) 500 MG 24 hr tablet, 1 tab daily x 1 week, then increase to 2 tabs  x 1 week, then increase to 2 tabs twice daily, Disp: 90 tablet, Rfl: 11     Allergies:   No Known Allergies    Social History:    Currently works and goes to school    Minimal exercise currently - denies etoh/tob/drugs    Family History:     Ethnicity: AA    She denies contributory family history    Review of Systems:  Neg except for as noted in HPI    The remaining prenatal laboratory results are not available for the review during the consultation.    Ultrasound:  o See imaging tab for result of today's ultrasound    Physical examination was deferred at this time.    In light of the patient s history as listed above our conversation and recommendations can be summarized briefly as follows:     Diabetes Mellitus (Pregestational)    We discussed that women with diabetes have significantly increased incidence of adverse pregnancy outcome. Complications in pregnant women with diabetes include preeclampsia, spontaneous ,  term delivery, congenital malformation, stillbirth, macrosomia,  respiratory distress, hypoglycemia, and hyperbilirubinemia.  Women who are in poor glycemic control during the period of fetal organogenesis have a high incidence of spontaneous  and congenital anomalies. The incidence of these outcomes is directly related to the hemoglobin A1C (HbA1C) level.  Cardiac defects are  most commonly seen as well as renal, central nervous system, spinal and gastrointestinal anomalies.        We discussed the importance of good glycemic control throughout the pregnancy to prevent the above listed complications.  In addition, concurrent maternal medical disorders are associated with long-standing diabetes mellitus, especially: thyroid dysfunction, retinopathy, nephropathy, and vascular disease.  To better understand the risks of undergoing pregnancy, a baseline assessment of each of these organs is recommended.  She has had an EKG already in this pregnancy.       Obesity    Obesity during pregnancy is associated with hypertension, preeclampsia, diabetes, venous thromboembolism, fetal macrosomia,  delivery, and complications of  delivery (excessive blood loss, infection, wound separation).  Obesity is associated with fetal anomalies, specifically neural tube defects. Multiple studies suggest that pre-pregnancy obesity (BMI ? 30) increases the risk for stillbirth, though the etiology is not known.  We discussed the risks of obesity in our consultation today.        Recommendations:    Continued consultation with a diabetes educator    Initiation of a wholesome diet; often recommended is:   o 40-50% complex, high fiber carbohydrate  o 10-30% protein  o 20-35% unsaturated fats    Education regarding carbohydrate counting    Continue glucose testing with fasting and one - two hour postprandial throughout the pregnancy  o With goals of fasting levels below 95 mg/dL and postprandial levels below 140 mg/dL for 1 hr, 120 for 2 hr  o We discussed importance of bringing her meter with her to her visits for appropriate medication titration    Women should have home urine strips to assess for ketonuria when their serum glucose is > 200 mg/dL.  They should be instructed to immediately report any ketonuria because of the increased risk for DKA    We discussed initiation of low dose aspirin for  preeclampsia prophylaxis     Baseline laboratories  o Hemoglobin A1C, and every trimester thereafter (patient has these up to date)  o Thyroid stimulating hormone (TSH)  o Urine Pro/Cr ratio; this can be followed up with a 24-hour urine for total protein and creatinine clearance if needed  o Baseline liver function tests, platelet count, and serum creatinine   o Baseline EKG- patient has a normal EKG in her chart  o Ophthalmologic examination  o Urine cultures every trimester and aggressive treatment of bacteriuria      surveillance    Serial ultrasounds for fetal growth monthly starting at 24 weeks     fetal testing with twice weekly BPP at 32 weeks; if the patient is poorly controlled weekly testing should be initiated at 28 weeks and increased to twice weekly at 32 weeks     Delivery    If well-controlled, delivery at 39 & 0/7 - 39 & 6/7 weeks is recommended    For women with poorly controlled diabetes, a delivery before 39 weeks may be indicated     Maternal glucose should be maintained at  mg/dL during delivery; this can be done with an insulin drip and requires hourly finger stick assessment in active labor     Postpartum    Exclusive breastfeeding should be encouraged in women with DM given significant short-term and long-term benefits to both infant and mother    Usually insulin requirements immediately decrease to about one third to one half of the predelivery regimen    Future healthcare maintenance    Continue follow-up with her endocrinologist or primary care provider    Preconception counseling prior to any future pregnancies    Continue folic acid at least 400 mcg     Short-acting insulin should be stopped if NPO is anticipated due to risk for hypoglycemia    Prior to admission for surgery, NPH insulin may be given the evening before     Postpartum    Exclusive breastfeeding should be encouraged in women with DM given significant short-term and long-term benefits to both infant  and mother    Usually insulin requirements immediately decrease to about one third to one half of the predelivery regimen       At the end of our discussion, Ms. Garcia indicated that her questions were answered and she seemed satisfied with our discussion.  Thank you for the opportunity to participate in your patient s care.  If I can be of any further assistance, please do not hesitate to contact me.    I acted as a scribe for Dr. Marquez during today's visit.    Belle Brandt MD    Physician Attestation   I, Sameer Marquez, saw and evaluated Ava Garcia with the resident/fellow.      I have reviewed and discussed with Dr. Brandt the patient history, physical exam and plan of care. I personally reviewed the vital signs, medications, lab results and imaging.    Key history or physical exam findings: Type II DM diagnosed 2 years ago. Maternal obesity.    Key management decisions made: continue monitoring blood sugar and adjust insulin as indicated. Screen for renal or cardiac disease with PCR and EKG.    Sameer Marquez  Date of Service (when I saw the patient): 03/05/19    Time Spent on this Encounter   I, Sameer Marquez, spent a total of 30 minutes in face to face consultation today managing the care of Ava Garcia.  Over 50% of my time on the unit was spent counseling the patient and /or coordinating care regarding management of type II DM and obesity in pregnancy. See note for details.

## 2019-03-05 ENCOUNTER — HOSPITAL ENCOUNTER (OUTPATIENT)
Dept: ULTRASOUND IMAGING | Facility: CLINIC | Age: 24
End: 2019-03-05
Payer: COMMERCIAL

## 2019-03-05 ENCOUNTER — OFFICE VISIT (OUTPATIENT)
Dept: MATERNAL FETAL MEDICINE | Facility: CLINIC | Age: 24
End: 2019-03-05
Payer: COMMERCIAL

## 2019-03-05 ENCOUNTER — HOSPITAL ENCOUNTER (OUTPATIENT)
Dept: CARDIOLOGY | Facility: CLINIC | Age: 24
Discharge: HOME | End: 2019-03-05
Payer: COMMERCIAL

## 2019-03-05 DIAGNOSIS — O99.212 MATERNAL OBESITY SYNDROME IN SECOND TRIMESTER: Primary | ICD-10-CM

## 2019-03-05 DIAGNOSIS — O24.112 PRE-EXISTING TYPE 2 DIABETES MELLITUS DURING PREGNANCY IN SECOND TRIMESTER: ICD-10-CM

## 2019-03-05 PROCEDURE — G0463 HOSPITAL OUTPT CLINIC VISIT: HCPCS | Mod: 25

## 2019-03-05 PROCEDURE — 76811 OB US DETAILED SNGL FETUS: CPT

## 2019-03-05 PROCEDURE — 76825 ECHO EXAM OF FETAL HEART: CPT

## 2019-03-05 NOTE — PROGRESS NOTES
Fetal Cardiology Consultation    Patient:  Ava Garcia MRN:  [id number]   YOB: 1995 Age:  23 year old   Date of Visit:  3/5/2019 PCP:  Nay Ref-Primary, Physician   MARIA ANTONIA: 6/12/19 EGA: 25+6 weeks     Dear Dr. Nunez:    I had the pleasure of seeing Ava Garcia at the [facility] in [place] on 3/5/2019 in consultation for fetal echocardiography results. She presented today by herself. As you know, she is a 23 year old female with T2DM.    The fetal echocardiogram was normal. Normal fetal cardiac anatomy. Normal right and left ventricular size and function without hypertrophy. No evidence of diastolic dysfunction. No pericardial effusion. No arrhythmia.     I reviewed and interpreted the fetal echocardiogram today. I discussed the normal results with Ms. Garcia. While these results are normal, it is important to note that fetal echocardiography cannot exclude small atrial or ventricular septal defects, persistent ductus arteriosus, mild coarctation of the aorta, partial anomalous pulmonary venous return, minor anatomic valve anomalies, or coronary artery anomalies.     Thank you for allowing me to participate in Ms. Garcia's care. Please don't hesitate to contact me or the Fetal Cardiology team at [facility] with any questions or concerns.     I spent a total of 10 minutes face-to-face with Ms. Garcia during today's office visit. Over 50% of this time was spent counseling the patient and/or coordinating care regarding the fetal echocardiography results.     Steven Ro  Pediatric Cardiology  [facility]  Phone [phone]

## 2019-03-05 NOTE — PROGRESS NOTES
Pt presents to [facility] for assessment and evaluation of her pregnancy due to diabetic type 2 uncontrolled on insulin. Pt had us done and reviewed by Dr. Marquez. See epic for today's findings. Pt met with Dr. Brandt and Dr. Marquez for consult. See epic for consult discussion and recommendations. Pt questions answered. Will return in 3-4 weeks for follow up growth and start bpp's at 32 weeks. Pt states +fm, no lof or bleeding. Will follow up with diabetic ed for insulin concerns. Mela Philip RN

## 2019-03-07 ENCOUNTER — CARE COORDINATION (OUTPATIENT)
Dept: EDUCATION SERVICES | Facility: CLINIC | Age: 24
End: 2019-03-07

## 2019-03-07 NOTE — PROGRESS NOTES
Diabetes Educator Note:    Sent my chart message to patient to please share blood glucose results (previously requested).  She was instructed last Friday to increase her Lantus insulin to 25 units and to add Novolog 5 units at each meal.  She had texted me her numbers, which are all over target:  Fasting 116 to 138, and one hour post meals of 145 to 180.  Asked her to check in this past Monday, but haven't heard anything.  Note that she did make it to her U/S appointment on Tuesday.  She was last seen in Diabetes Education on Feb 19.  She has last seen by endocrinology Jan 23.  She has a follow up appointment with Carline Flowers on March 28.      Am waiting for a response.       INDIO PowellN, RN, CDE  Diabetes   [facility]  [facility] Room [address] [place] [address]  Email:  [email or web address]  Phone:  [phone]

## 2019-04-10 ENCOUNTER — HOSPITAL ENCOUNTER (OUTPATIENT)
Dept: ULTRASOUND IMAGING | Facility: CLINIC | Age: 24
Discharge: HOME | End: 2019-04-10
Attending: OBSTETRICS & GYNECOLOGY | Admitting: OBSTETRICS & GYNECOLOGY
Payer: COMMERCIAL

## 2019-04-10 ENCOUNTER — OFFICE VISIT (OUTPATIENT)
Dept: MATERNAL FETAL MEDICINE | Facility: CLINIC | Age: 24
End: 2019-04-10
Attending: OBSTETRICS & GYNECOLOGY
Payer: COMMERCIAL

## 2019-04-10 DIAGNOSIS — O24.112 PRE-EXISTING TYPE 2 DIABETES MELLITUS DURING PREGNANCY IN SECOND TRIMESTER: Primary | ICD-10-CM

## 2019-04-10 DIAGNOSIS — O24.112 PRE-EXISTING TYPE 2 DIABETES MELLITUS DURING PREGNANCY IN SECOND TRIMESTER: ICD-10-CM

## 2019-04-10 PROCEDURE — 76816 OB US FOLLOW-UP PER FETUS: CPT

## 2019-04-10 NOTE — PROGRESS NOTES
"Please see \"Imaging\" tab under \"Chart Review\" for details of today's US.    Emmie Hoffman, DO    "

## 2019-04-12 ENCOUNTER — AMBULATORY - HEALTHEAST (OUTPATIENT)
Dept: MATERNAL FETAL MEDICINE | Facility: HOSPITAL | Age: 24
End: 2019-04-12

## 2019-04-12 DIAGNOSIS — O26.90 PREGNANCY, ANTEPARTUM, COMPLICATIONS: ICD-10-CM

## 2019-04-19 ENCOUNTER — OFFICE VISIT - HEALTHEAST (OUTPATIENT)
Dept: MATERNAL FETAL MEDICINE | Facility: HOSPITAL | Age: 24
End: 2019-04-19

## 2019-04-19 ENCOUNTER — RECORDS - HEALTHEAST (OUTPATIENT)
Dept: ULTRASOUND IMAGING | Facility: HOSPITAL | Age: 24
End: 2019-04-19

## 2019-04-19 ENCOUNTER — RECORDS - HEALTHEAST (OUTPATIENT)
Dept: ADMINISTRATIVE | Facility: OTHER | Age: 24
End: 2019-04-19

## 2019-04-19 DIAGNOSIS — O26.90 PREGNANCY RELATED CONDITIONS, UNSPECIFIED, UNSPECIFIED TRIMESTER: ICD-10-CM

## 2019-04-19 DIAGNOSIS — O24.119 PRE-EXISTING TYPE 2 DIABETES MELLITUS DURING PREGNANCY, ANTEPARTUM: ICD-10-CM

## 2019-04-22 ENCOUNTER — RECORDS - HEALTHEAST (OUTPATIENT)
Dept: ADMINISTRATIVE | Facility: OTHER | Age: 24
End: 2019-04-22

## 2019-04-22 ENCOUNTER — RECORDS - HEALTHEAST (OUTPATIENT)
Dept: ULTRASOUND IMAGING | Facility: HOSPITAL | Age: 24
End: 2019-04-22

## 2019-04-22 ENCOUNTER — OFFICE VISIT - HEALTHEAST (OUTPATIENT)
Dept: MATERNAL FETAL MEDICINE | Facility: HOSPITAL | Age: 24
End: 2019-04-22

## 2019-04-22 DIAGNOSIS — O26.90 PREGNANCY RELATED CONDITIONS, UNSPECIFIED, UNSPECIFIED TRIMESTER: ICD-10-CM

## 2019-04-22 DIAGNOSIS — O24.119 PRE-EXISTING TYPE 2 DIABETES MELLITUS DURING PREGNANCY, ANTEPARTUM: ICD-10-CM

## 2019-04-23 ENCOUNTER — AMBULATORY - HEALTHEAST (OUTPATIENT)
Dept: MATERNAL FETAL MEDICINE | Facility: HOSPITAL | Age: 24
End: 2019-04-23

## 2019-05-15 ENCOUNTER — OFFICE VISIT - HEALTHEAST (OUTPATIENT)
Dept: SURGERY | Facility: CLINIC | Age: 24
End: 2019-05-15

## 2019-05-15 DIAGNOSIS — K81.9 CHOLECYSTITIS: ICD-10-CM

## 2019-05-22 ASSESSMENT — MIFFLIN-ST. JEOR: SCORE: 1943.84

## 2019-05-23 ENCOUNTER — OFFICE VISIT - HEALTHEAST (OUTPATIENT)
Dept: FAMILY MEDICINE | Facility: CLINIC | Age: 24
End: 2019-05-23

## 2019-05-23 DIAGNOSIS — Z01.818 PRE-OP EXAMINATION: ICD-10-CM

## 2019-05-23 DIAGNOSIS — E66.01 MORBID OBESITY (H): ICD-10-CM

## 2019-05-23 DIAGNOSIS — E11.9 TYPE 2 DIABETES MELLITUS WITHOUT COMPLICATION, WITHOUT LONG-TERM CURRENT USE OF INSULIN (H): ICD-10-CM

## 2019-05-23 LAB
FASTING STATUS PATIENT QL REPORTED: NO
GLUCOSE BLD-MCNC: 163 MG/DL (ref 74–125)
HBA1C MFR BLD: 7.5 % (ref 3.5–6)
HCG UR QL: NEGATIVE
HGB BLD-MCNC: 12.9 G/DL (ref 12–16)

## 2019-05-23 ASSESSMENT — MIFFLIN-ST. JEOR: SCORE: 1965.38

## 2019-05-24 ENCOUNTER — ANESTHESIA - HEALTHEAST (OUTPATIENT)
Dept: SURGERY | Facility: AMBULATORY SURGERY CENTER | Age: 24
End: 2019-05-24

## 2019-05-28 ENCOUNTER — SURGERY - HEALTHEAST (OUTPATIENT)
Dept: SURGERY | Facility: AMBULATORY SURGERY CENTER | Age: 24
End: 2019-05-28

## 2019-07-16 ENCOUNTER — OFFICE VISIT - HEALTHEAST (OUTPATIENT)
Dept: FAMILY MEDICINE | Facility: CLINIC | Age: 24
End: 2019-07-16

## 2019-07-16 ENCOUNTER — RECORDS - HEALTHEAST (OUTPATIENT)
Dept: ADMINISTRATIVE | Facility: OTHER | Age: 24
End: 2019-07-16

## 2019-07-16 DIAGNOSIS — Z30.017 NEXPLANON INSERTION: ICD-10-CM

## 2019-07-16 LAB — HCG UR QL: NEGATIVE

## 2019-07-16 ASSESSMENT — MIFFLIN-ST. JEOR: SCORE: 1988.93

## 2019-08-20 ENCOUNTER — OFFICE VISIT - HEALTHEAST (OUTPATIENT)
Dept: FAMILY MEDICINE | Facility: CLINIC | Age: 24
End: 2019-08-20

## 2019-08-20 ENCOUNTER — COMMUNICATION - HEALTHEAST (OUTPATIENT)
Dept: FAMILY MEDICINE | Facility: CLINIC | Age: 24
End: 2019-08-20

## 2019-08-20 DIAGNOSIS — N89.8 VAGINAL DISCHARGE: ICD-10-CM

## 2019-08-20 DIAGNOSIS — A59.9 TRICHOMONAS INFECTION: ICD-10-CM

## 2019-08-20 LAB
CLUE CELLS: ABNORMAL
TRICHOMONAS, WET PREP: ABNORMAL
YEAST, WET PREP: ABNORMAL

## 2019-08-21 LAB
C TRACH DNA SPEC QL PROBE+SIG AMP: NEGATIVE
N GONORRHOEA DNA SPEC QL NAA+PROBE: NEGATIVE

## 2019-09-17 ENCOUNTER — COMMUNICATION - HEALTHEAST (OUTPATIENT)
Dept: TELEHEALTH | Facility: CLINIC | Age: 24
End: 2019-09-17

## 2019-10-23 ENCOUNTER — COMMUNICATION - HEALTHEAST (OUTPATIENT)
Dept: SCHEDULING | Facility: CLINIC | Age: 24
End: 2019-10-23

## 2019-10-23 ENCOUNTER — OFFICE VISIT - HEALTHEAST (OUTPATIENT)
Dept: FAMILY MEDICINE | Facility: CLINIC | Age: 24
End: 2019-10-23

## 2019-10-23 DIAGNOSIS — H10.9 BACTERIAL CONJUNCTIVITIS: ICD-10-CM

## 2019-11-14 ENCOUNTER — COMMUNICATION - HEALTHEAST (OUTPATIENT)
Dept: FAMILY MEDICINE | Facility: CLINIC | Age: 24
End: 2019-11-14

## 2019-11-14 ENCOUNTER — OFFICE VISIT - HEALTHEAST (OUTPATIENT)
Dept: FAMILY MEDICINE | Facility: CLINIC | Age: 24
End: 2019-11-14

## 2019-11-14 DIAGNOSIS — E11.9 TYPE 2 DIABETES MELLITUS WITHOUT COMPLICATION, WITHOUT LONG-TERM CURRENT USE OF INSULIN (H): ICD-10-CM

## 2019-11-14 DIAGNOSIS — N93.8 DYSFUNCTIONAL UTERINE BLEEDING: ICD-10-CM

## 2019-11-14 DIAGNOSIS — E55.9 VITAMIN D DEFICIENCY: ICD-10-CM

## 2019-11-14 DIAGNOSIS — Z23 NEED FOR INFLUENZA VACCINATION: ICD-10-CM

## 2019-11-14 DIAGNOSIS — R30.9 PAINFUL URINATION: ICD-10-CM

## 2019-11-14 DIAGNOSIS — Z71.85 VACCINE COUNSELING: ICD-10-CM

## 2019-11-14 DIAGNOSIS — A59.9 TRICHOMONAS INFECTION: ICD-10-CM

## 2019-11-14 LAB
ALBUMIN UR-MCNC: ABNORMAL MG/DL
APPEARANCE UR: ABNORMAL
BACTERIA #/AREA URNS HPF: ABNORMAL HPF
BILIRUB UR QL STRIP: NEGATIVE
CLUE CELLS: ABNORMAL
COLOR UR AUTO: YELLOW
CREAT UR-MCNC: 113.6 MG/DL
GLUCOSE UR STRIP-MCNC: ABNORMAL MG/DL
HBA1C MFR BLD: 13.2 % (ref 3.5–6)
HGB UR QL STRIP: ABNORMAL
HIV 1+2 AB+HIV1 P24 AG SERPL QL IA: NEGATIVE
KETONES UR STRIP-MCNC: NEGATIVE MG/DL
LDLC SERPL CALC-MCNC: 170 MG/DL
LEUKOCYTE ESTERASE UR QL STRIP: ABNORMAL
MICROALBUMIN UR-MCNC: 6.75 MG/DL (ref 0–1.99)
MICROALBUMIN/CREAT UR: 59.4 MG/G
MUCOUS THREADS #/AREA URNS LPF: ABNORMAL LPF
NITRATE UR QL: NEGATIVE
PH UR STRIP: 6 [PH] (ref 5–8)
RBC #/AREA URNS AUTO: ABNORMAL HPF
SP GR UR STRIP: 1.01 (ref 1–1.03)
SQUAMOUS #/AREA URNS AUTO: ABNORMAL LPF
TRICHOMONAS #/AREA URNS HPF: PRESENT /[HPF]
TRICHOMONAS, WET PREP: ABNORMAL
TSH SERPL DL<=0.005 MIU/L-ACNC: 1.78 UIU/ML (ref 0.3–5)
UROBILINOGEN UR STRIP-ACNC: ABNORMAL
WBC #/AREA URNS AUTO: ABNORMAL HPF
YEAST, WET PREP: ABNORMAL

## 2019-11-15 LAB
25(OH)D3 SERPL-MCNC: 11.6 NG/ML (ref 30–80)
C TRACH DNA SPEC QL PROBE+SIG AMP: NEGATIVE
HCV AB SERPL QL IA: NEGATIVE
N GONORRHOEA DNA SPEC QL NAA+PROBE: NEGATIVE
T PALLIDUM AB SER QL: NEGATIVE

## 2019-11-16 LAB — BACTERIA SPEC CULT: ABNORMAL

## 2019-11-18 ENCOUNTER — COMMUNICATION - HEALTHEAST (OUTPATIENT)
Dept: FAMILY MEDICINE | Facility: CLINIC | Age: 24
End: 2019-11-18

## 2019-11-18 DIAGNOSIS — N30.00 ACUTE CYSTITIS WITHOUT HEMATURIA: ICD-10-CM

## 2019-12-16 ENCOUNTER — OFFICE VISIT - HEALTHEAST (OUTPATIENT)
Dept: FAMILY MEDICINE | Facility: CLINIC | Age: 24
End: 2019-12-16

## 2019-12-16 DIAGNOSIS — E11.9 TYPE 2 DIABETES MELLITUS WITHOUT COMPLICATION, WITHOUT LONG-TERM CURRENT USE OF INSULIN (H): ICD-10-CM

## 2019-12-16 DIAGNOSIS — E55.9 VITAMIN D DEFICIENCY: ICD-10-CM

## 2019-12-16 DIAGNOSIS — A59.9 TRICHOMONAS INFECTION: ICD-10-CM

## 2019-12-16 DIAGNOSIS — N39.0 URINARY TRACT INFECTION WITHOUT HEMATURIA, SITE UNSPECIFIED: ICD-10-CM

## 2019-12-16 DIAGNOSIS — B37.31 YEAST INFECTION OF THE VAGINA: ICD-10-CM

## 2019-12-16 LAB
CLUE CELLS: ABNORMAL
TRICHOMONAS, WET PREP: ABNORMAL
YEAST, WET PREP: ABNORMAL

## 2019-12-17 LAB
C TRACH DNA SPEC QL PROBE+SIG AMP: NEGATIVE
N GONORRHOEA DNA SPEC QL NAA+PROBE: NEGATIVE

## 2019-12-20 ENCOUNTER — AMBULATORY - HEALTHEAST (OUTPATIENT)
Dept: FAMILY MEDICINE | Facility: CLINIC | Age: 24
End: 2019-12-20

## 2019-12-20 DIAGNOSIS — A59.9 TRICHOMONAS INFECTION: ICD-10-CM

## 2019-12-22 ENCOUNTER — COMMUNICATION - HEALTHEAST (OUTPATIENT)
Dept: FAMILY MEDICINE | Facility: CLINIC | Age: 24
End: 2019-12-22

## 2019-12-22 ENCOUNTER — RECORDS - HEALTHEAST (OUTPATIENT)
Dept: FAMILY MEDICINE | Facility: CLINIC | Age: 24
End: 2019-12-22

## 2019-12-22 DIAGNOSIS — Z20.2 TRICHOMONAS EXPOSURE: ICD-10-CM

## 2020-01-01 ENCOUNTER — AMBULATORY - HEALTHEAST (OUTPATIENT)
Dept: MULTI SPECIALTY CLINIC | Facility: CLINIC | Age: 25
End: 2020-01-01

## 2020-01-01 LAB — RETINOPATHY: NORMAL

## 2020-02-05 ENCOUNTER — COMMUNICATION - HEALTHEAST (OUTPATIENT)
Dept: PHARMACY | Facility: CLINIC | Age: 25
End: 2020-02-05

## 2020-02-05 DIAGNOSIS — N93.8 DYSFUNCTIONAL UTERINE BLEEDING: ICD-10-CM

## 2020-02-12 ENCOUNTER — OFFICE VISIT - HEALTHEAST (OUTPATIENT)
Dept: FAMILY MEDICINE | Facility: CLINIC | Age: 25
End: 2020-02-12

## 2020-02-12 DIAGNOSIS — E11.9 TYPE 2 DIABETES MELLITUS WITHOUT COMPLICATION, WITHOUT LONG-TERM CURRENT USE OF INSULIN (H): ICD-10-CM

## 2020-02-12 DIAGNOSIS — Z11.3 SCREEN FOR STD (SEXUALLY TRANSMITTED DISEASE): ICD-10-CM

## 2020-02-12 DIAGNOSIS — E66.01 MORBID OBESITY (H): ICD-10-CM

## 2020-02-12 DIAGNOSIS — A59.9 TRICHOMONAS INFECTION: ICD-10-CM

## 2020-02-12 LAB
CLUE CELLS: ABNORMAL
HIV 1+2 AB+HIV1 P24 AG SERPL QL IA: NEGATIVE
TRICHOMONAS, WET PREP: ABNORMAL
YEAST, WET PREP: ABNORMAL

## 2020-02-13 ENCOUNTER — COMMUNICATION - HEALTHEAST (OUTPATIENT)
Dept: FAMILY MEDICINE | Facility: CLINIC | Age: 25
End: 2020-02-13

## 2020-02-13 LAB
C TRACH DNA SPEC QL PROBE+SIG AMP: NEGATIVE
N GONORRHOEA DNA SPEC QL NAA+PROBE: NEGATIVE
T PALLIDUM AB SER QL: NEGATIVE

## 2020-03-02 ENCOUNTER — COMMUNICATION - HEALTHEAST (OUTPATIENT)
Dept: FAMILY MEDICINE | Facility: CLINIC | Age: 25
End: 2020-03-02

## 2020-03-11 ENCOUNTER — HEALTH MAINTENANCE LETTER (OUTPATIENT)
Age: 25
End: 2020-03-11

## 2020-05-10 ENCOUNTER — COMMUNICATION - HEALTHEAST (OUTPATIENT)
Dept: FAMILY MEDICINE | Facility: CLINIC | Age: 25
End: 2020-05-10

## 2020-05-10 DIAGNOSIS — A59.9 TRICHOMONAS INFECTION: ICD-10-CM

## 2020-07-15 ENCOUNTER — COMMUNICATION - HEALTHEAST (OUTPATIENT)
Dept: FAMILY MEDICINE | Facility: CLINIC | Age: 25
End: 2020-07-15

## 2020-08-17 ENCOUNTER — OFFICE VISIT - HEALTHEAST (OUTPATIENT)
Dept: FAMILY MEDICINE | Facility: CLINIC | Age: 25
End: 2020-08-17

## 2020-08-17 DIAGNOSIS — N89.8 VAGINAL IRRITATION: ICD-10-CM

## 2020-08-17 DIAGNOSIS — E11.9 TYPE 2 DIABETES MELLITUS WITHOUT COMPLICATION, WITHOUT LONG-TERM CURRENT USE OF INSULIN (H): ICD-10-CM

## 2020-08-17 DIAGNOSIS — R39.89 POSSIBLE URINARY TRACT INFECTION: ICD-10-CM

## 2020-08-17 DIAGNOSIS — A59.9 TRICHOMONAS INFECTION: ICD-10-CM

## 2020-08-17 LAB
ALBUMIN UR-MCNC: NEGATIVE MG/DL
ANION GAP SERPL CALCULATED.3IONS-SCNC: 12 MMOL/L (ref 5–18)
APPEARANCE UR: CLEAR
BACTERIA #/AREA URNS HPF: ABNORMAL HPF
BILIRUB UR QL STRIP: NEGATIVE
BUN SERPL-MCNC: 8 MG/DL (ref 8–22)
CALCIUM SERPL-MCNC: 9.9 MG/DL (ref 8.5–10.5)
CHLORIDE BLD-SCNC: 103 MMOL/L (ref 98–107)
CLUE CELLS: ABNORMAL
CO2 SERPL-SCNC: 23 MMOL/L (ref 22–31)
COLOR UR AUTO: YELLOW
CREAT SERPL-MCNC: 0.83 MG/DL (ref 0.6–1.1)
GFR SERPL CREATININE-BSD FRML MDRD: >60 ML/MIN/1.73M2
GLUCOSE BLD-MCNC: 263 MG/DL (ref 70–125)
GLUCOSE UR STRIP-MCNC: ABNORMAL MG/DL
HBA1C MFR BLD: 11.3 %
HGB UR QL STRIP: ABNORMAL
KETONES UR STRIP-MCNC: NEGATIVE MG/DL
LDLC SERPL CALC-MCNC: 183 MG/DL
LEUKOCYTE ESTERASE UR QL STRIP: ABNORMAL
NITRATE UR QL: NEGATIVE
PH UR STRIP: 6 [PH] (ref 5–8)
POTASSIUM BLD-SCNC: 4.1 MMOL/L (ref 3.5–5)
RBC #/AREA URNS AUTO: ABNORMAL HPF
SODIUM SERPL-SCNC: 138 MMOL/L (ref 136–145)
SP GR UR STRIP: 1.02 (ref 1–1.03)
SQUAMOUS #/AREA URNS AUTO: ABNORMAL LPF
TRICHOMONAS, WET PREP: ABNORMAL
UROBILINOGEN UR STRIP-ACNC: ABNORMAL
WBC #/AREA URNS AUTO: ABNORMAL HPF
YEAST, WET PREP: ABNORMAL

## 2020-08-17 ASSESSMENT — MIFFLIN-ST. JEOR: SCORE: 1959.71

## 2020-08-18 LAB — BACTERIA SPEC CULT: NORMAL

## 2020-08-19 ENCOUNTER — AMBULATORY - HEALTHEAST (OUTPATIENT)
Dept: FAMILY MEDICINE | Facility: CLINIC | Age: 25
End: 2020-08-19

## 2020-08-19 DIAGNOSIS — A74.9 CHLAMYDIA INFECTION: ICD-10-CM

## 2020-08-19 LAB
C TRACH DNA SPEC QL PROBE+SIG AMP: POSITIVE
N GONORRHOEA DNA SPEC QL NAA+PROBE: NEGATIVE

## 2020-08-25 ENCOUNTER — OFFICE VISIT - HEALTHEAST (OUTPATIENT)
Dept: EDUCATION SERVICES | Facility: CLINIC | Age: 25
End: 2020-08-25

## 2020-08-25 DIAGNOSIS — E11.9 TYPE 2 DIABETES MELLITUS WITHOUT COMPLICATION, WITHOUT LONG-TERM CURRENT USE OF INSULIN (H): ICD-10-CM

## 2020-09-15 ENCOUNTER — OFFICE VISIT - HEALTHEAST (OUTPATIENT)
Dept: EDUCATION SERVICES | Facility: CLINIC | Age: 25
End: 2020-09-15

## 2020-09-28 ENCOUNTER — COMMUNICATION - HEALTHEAST (OUTPATIENT)
Dept: FAMILY MEDICINE | Facility: CLINIC | Age: 25
End: 2020-09-28

## 2020-09-28 DIAGNOSIS — E11.9 TYPE 2 DIABETES MELLITUS WITHOUT COMPLICATION, WITHOUT LONG-TERM CURRENT USE OF INSULIN (H): ICD-10-CM

## 2020-11-05 ENCOUNTER — OFFICE VISIT - HEALTHEAST (OUTPATIENT)
Dept: FAMILY MEDICINE | Facility: CLINIC | Age: 25
End: 2020-11-05

## 2020-11-05 DIAGNOSIS — B37.31 YEAST INFECTION OF THE VAGINA: ICD-10-CM

## 2020-11-05 DIAGNOSIS — N89.8 VAGINAL IRRITATION: ICD-10-CM

## 2020-11-05 DIAGNOSIS — E11.9 TYPE 2 DIABETES MELLITUS WITHOUT COMPLICATION, WITHOUT LONG-TERM CURRENT USE OF INSULIN (H): ICD-10-CM

## 2020-11-05 LAB
CLUE CELLS: ABNORMAL
CREAT UR-MCNC: 64.5 MG/DL
HBA1C MFR BLD: >14 %
MICROALBUMIN UR-MCNC: 1.23 MG/DL (ref 0–1.99)
MICROALBUMIN/CREAT UR: 19.1 MG/G
TRICHOMONAS, WET PREP: ABNORMAL
YEAST, WET PREP: ABNORMAL

## 2020-11-06 LAB
C TRACH DNA SPEC QL PROBE+SIG AMP: NEGATIVE
N GONORRHOEA DNA SPEC QL NAA+PROBE: NEGATIVE

## 2021-01-03 ENCOUNTER — COMMUNICATION - HEALTHEAST (OUTPATIENT)
Dept: FAMILY MEDICINE | Facility: CLINIC | Age: 26
End: 2021-01-03

## 2021-01-03 ENCOUNTER — HEALTH MAINTENANCE LETTER (OUTPATIENT)
Age: 26
End: 2021-01-03

## 2021-01-03 DIAGNOSIS — B37.31 YEAST INFECTION OF THE VAGINA: ICD-10-CM

## 2021-01-15 ENCOUNTER — AMBULATORY - HEALTHEAST (OUTPATIENT)
Dept: MULTI SPECIALTY CLINIC | Facility: CLINIC | Age: 26
End: 2021-01-15

## 2021-01-15 LAB — RETINOPATHY: NORMAL

## 2021-01-21 ENCOUNTER — COMMUNICATION - HEALTHEAST (OUTPATIENT)
Dept: FAMILY MEDICINE | Facility: CLINIC | Age: 26
End: 2021-01-21

## 2021-01-21 ENCOUNTER — OFFICE VISIT - HEALTHEAST (OUTPATIENT)
Dept: FAMILY MEDICINE | Facility: CLINIC | Age: 26
End: 2021-01-21

## 2021-01-21 DIAGNOSIS — Z11.3 SCREEN FOR STD (SEXUALLY TRANSMITTED DISEASE): ICD-10-CM

## 2021-01-21 DIAGNOSIS — E11.9 TYPE 2 DIABETES MELLITUS WITHOUT COMPLICATION, WITHOUT LONG-TERM CURRENT USE OF INSULIN (H): ICD-10-CM

## 2021-01-21 LAB
CLUE CELLS: NORMAL
HBA1C MFR BLD: 13.2 %
HIV 1+2 AB+HIV1 P24 AG SERPL QL IA: NEGATIVE
TRICHOMONAS, WET PREP: NORMAL
YEAST, WET PREP: NORMAL

## 2021-05-01 ENCOUNTER — HEALTH MAINTENANCE LETTER (OUTPATIENT)
Age: 26
End: 2021-05-01

## 2021-05-28 NOTE — PROGRESS NOTES
MFM received call from [facility].  Patient instructed to have follow-up ultrasounds done with them. Cancelled all future MFM appointments.    Stephanie Nielsen

## 2021-05-28 NOTE — PROGRESS NOTES
"Please see \"Imaging\" tab under Chart Review for full report.  This ultrasound was performed in the [facility], and may be located under Care Everywhere.    Cheri Anderson MD  Maternal Fetal Medicine    "

## 2021-05-28 NOTE — PROGRESS NOTES
I was consulted by Mela Head DO, to evaluate this patients gall bladder.    HPI: Ava Garcia is a 23 y.o. female who has been experiencing some problems with epigastric abdominal pain which brought her into the [facility] ER.  She is 2 months post partum.  . she has been noting this for about 1 week.  It is not associated with nausea or vomiting.  US with stones,  The CT showed a distended gall bladder      Allergies:Patient has no known allergies.    No past medical history on file.    Past Surgical History:   Procedure Laterality Date     BUNIONECTOMY  2014, 2015    x2       CURRENT MEDS:    Current Outpatient Medications:      cholecalciferol, vitamin D3, 50,000 unit Tab, Take 1 tablet by mouth once a week., Disp: 12 tablet, Rfl: 3     ECONTRA EZ tablet, , Disp: , Rfl:      folic acid (FOLVITE) 1 MG tablet, , Disp: , Rfl:      ibuprofen (ADVIL,MOTRIN) 600 MG tablet, , Disp: , Rfl:      metFORMIN (GLUCOPHAGE-XR) 500 MG 24 hr tablet, , Disp: , Rfl:      prenatal vit-iron fum-folic ac (PRENATAL VITAMIN) 27 mg iron- 0.8 mg Tab, Take 1 tablet by mouth once daily., Disp: 100 tablet, Rfl: 3     XULANE 150-35 mcg/24 hr, , Disp: , Rfl:     Family History   Problem Relation Age of Onset     Polycystic ovary syndrome Mother      Alcohol abuse Father      No Medical Problems Sister      No Medical Problems Brother         reports that she has never smoked. She has never used smokeless tobacco. She reports that she drank alcohol. She reports that she does not use drugs.    Review of Systems:  10 system were reviewed and all are within normal limits except for those listed in the HPI.      EXAM:  /70 (Patient Site: Right Arm, Patient Position: Sitting, Cuff Size: Adult Large)   Pulse 81   Wt (!) 257 lb (116.6 kg)   LMP 09/05/2018 (Exact Date)   SpO2 98%   BMI 40.25 kg/m    GENERAL: Well developed female   EYES: Anicteric Sclera,  EOMI  CARDIAC: RRR w/out murmur  CHEST/LUNG: Clear to ascultation, No  wheezes  ABDOMEN: Soft with, +Bowel Sounds  NEURO:No focal deficits, ambulatory  LYMPH: No Axillary or inguinal Adenopathy  EXTREMITIES: Ambulatory, No lower extremity deformities      LABS:  Lab Results   Component Value Date    WBC 7.1 05/12/2019    HGB 12.2 05/12/2019    HCT 39.3 05/12/2019    MCV 88 05/12/2019     05/12/2019     INR/Prothrombin Time  Results from last 7 days   Lab Units 05/12/19 2008   LN-SODIUM mmol/L 142   LN-POTASSIUM mmol/L 4.1   LN-CHLORIDE mmol/L 105   LN-CO2 mmol/L 27   LN-BLOOD UREA NITROGEN mg/dL 10   LN-CREATININE mg/dL 0.81   LN-CALCIUM mg/dL 9.6     Lab Results   Component Value Date    ALT 67 (H) 05/12/2019     (H) 05/12/2019    ALKPHOS 171 (H) 05/12/2019    BILITOT 0.3 05/12/2019       IMAGES:   I reviewed the US and see the presence of Gall Stones    Assessment/Plan: Pt with signs and symptoms consistent with chronic cholecystitis. I have recommended a cholecystectomy. I discussed with her the plan to do this laparoscopically understanding the possibility of needing to convert to an open operation. I went over some of the risks of surgery including but not limited to bleeding, infection and bile duct injury. I also discussed the outpatient nature of the surgery and the expected recovery time.         Cristobal Lipscomb MD  [phone]  [facility] Department of Surgery

## 2021-05-28 NOTE — PROGRESS NOTES
"Please see \"Imaging\" tab under Chart Review for full report.  This ultrasound was performed in the [facility], and may be located under Care Everywhere.    Cheri Anderson MD  Maternal Fetal Medicine    "

## 2021-05-29 NOTE — PROGRESS NOTES
[facility] PRE-OPERATIVE VISIT NOTE    Ava Garcia,  1995    Upcoming surgery date: 19  Surgeon: Dr. Lipscomb  Surgery Facility: [facility]    Procedure: Cholecystectomy, laparoscopic    SUBJECTIVE:  History of Present Illness:   Ava Garcia is a 23 y.o. female with right upper quadrant pain and cholelithiasis.  She has had 2 ER visits this month for right upper quadrant pain.  First ER visit was on 2019, second was on 2019.  I reviewed both ER notes, all labs done at ER visits, and both ultrasounds.  Lab work-up was grossly normal, with the exception of mildly elevated liver function tests.  Ultrasound on 2019 showed cholelithiasis.  Patient saw surgeon and she is scheduled for cholecystectomy next week.  I do not have the surgery consult note available to me today.  She is avoiding fried and fatty foods.  No acute concerns today.  She is about 3 weeks postpartum.  She was treated for diabetes during pregnancy.  She has since stopped metformin.    Patient Active Problem List   Diagnosis     Type 2 diabetes mellitus (H)     Vitamin D deficiency         Current Outpatient Medications:      cholecalciferol, vitamin D3, 50,000 unit Tab, Take 1 tablet by mouth once a week., Disp: 12 tablet, Rfl: 3     oxyCODONE-acetaminophen (PERCOCET/ENDOCET) 5-325 mg per tablet, Take 1 tablet by mouth every 6 (six) hours as needed., Disp: 13 tablet, Rfl: 0     prenatal vit-iron fum-folic ac (PRENATAL VITAMIN) 27 mg iron- 0.8 mg Tab, Take 1 tablet by mouth once daily., Disp: 100 tablet, Rfl: 3    Past Medical History:   Diagnosis Date     Diabetes mellitus (H)     Gestational       Past Surgical History:   Procedure Laterality Date     BUNIONECTOMY  , 2015    x2       History of bleeding: none  History of anesthesia reactions: None    she  reports that she has never smoked. She has never used smokeless tobacco. She reports that she drank alcohol. She reports that she does  not use drugs.    Family History   Problem Relation Age of Onset     Polycystic ovary syndrome Mother      Alcohol abuse Father      No Medical Problems Sister      No Medical Problems Brother        Review of Systems:  Constitution: normal  HEENT: normal  Pulmonary: normal  Cardiovascular: normal  GI: normal  : normal  Musculoskeletal: normal  Neuro: normal  Endocrine: normal  Psych: normal  Lymph/Heme: normal    OBJECTIVE:    Allergies:  Patient has no known allergies.    Vitals:    05/23/19 1315   BP: 112/82   Pulse: 80   Resp: 16   SpO2: 98%     Body mass index is 40.84 kg/m .    Physical Exam:  Vital signs reviewed  General:          Patient is Alert and oriented x 3, in no apparent distress  Head:   Normocephalic, without obvious abnormality, atraumatic  Eyes:   PERRL, conjunctiva/corneas clear, EOMs intact  ENT:   Normal tympanic membranes and external ear canals, no erythema or exudate in throat  Neck:    No adenopathy, normal ROM  Cardiac:  Regular, normal S1 and S2, no murmur, gallop or rub  Lungs:    Clear to auscultation bilaterally, respirations unlabored  Abdomen:  Soft, non-tender, normal bowel sounds, no masses, no organomegaly  Back:    ROM normal  Extremities:   Extremities normal, atraumatic, no cyanosis or edema  Skin:     Skin color, texture, turgor normal, no rashes or lesions  Lymph nodes:   Cervical nodes normal  Neurologic:   CNII-XII intact.   DTRs normal and symmetric.  Symmetric strength and sensation.    Recent Results (from the past 24 hour(s))   Hemoglobin    Collection Time: 05/23/19  1:41 PM   Result Value Ref Range    Hemoglobin 12.9 12.0 - 16.0 g/dL   Glucose    Collection Time: 05/23/19  1:41 PM   Result Value Ref Range    Glucose 163 (H) 74 - 125 mg/dL    Patient Fasting > 8hrs? No    Glycosylated Hemoglobin A1c    Collection Time: 05/23/19  1:41 PM   Result Value Ref Range    Hemoglobin A1c 7.5 (H) 3.5 - 6.0 %   Pregnancy (Beta-hCG, Qual), Urine    Collection Time: 05/23/19   1:57 PM   Result Value Ref Range    Pregnancy Test, Urine Negative Negative     EKG completed in February 2019, grossly normal.  Available in EHR.  I reviewed that report today.    ASSESSMENT AND PLAN:    1. Pre-op examination  2. Morbid Obesity  Moderate risk procedure, Low-Moderate risk patient.  Patient approved for scheduled surgery with the following anesthesia: choice  She is using Percocet as needed for pain.  She will avoid NSAIDs from now until surgery.  - Pregnancy (Beta-hCG, Qual), Urine  - Hemoglobin  - Glucose  - Glycosylated Hemoglobin A1c    3. Type 2 diabetes mellitus without complication, without long-term current use of insulin (H)  A1c =7.5%.  Past history of med noncompliance with metformin.  She says she was taking metformin during her pregnancy, but stopped after delivery.  She will be contacted about her A1c results.  I would strongly encourage her to restart diabetes medication postop.  I believe her son is taking high calorie formula, she is not breastfeeding.  She will follow-up with me, or another provider, after recovery from her surgery to address her diabetes  - Glycosylated Hemoglobin A1c    4. She is 3-4 weeks post-partum.  Child born at 33 weeks.  She is not breastfeeding.    This dictation uses voice recognition software, which may contain typographical errors.

## 2021-05-29 NOTE — ANESTHESIA PREPROCEDURE EVALUATION
Anesthesia Evaluation      Patient summary reviewed   No history of anesthetic complications     Airway   Mallampati: II  Neck ROM: full   Pulmonary - negative ROS and normal exam                          Cardiovascular - negative ROS and normal exam  Exercise tolerance: > or = 4 METS   Neuro/Psych - negative ROS     Endo/Other    (+) diabetes mellitus (off metformin since end of pregnancy) type 2 well controlled, obesity (bmi 40),      GI/Hepatic/Renal - negative ROS           Dental - normal exam                        Anesthesia Plan  Planned anesthetic: general endotracheal  Versed/fent/prop/chacho  Scop/decadron/zofran  ASA 3   Induction: intravenous   Anesthetic plan and risks discussed with: patient  Anesthesia plan special considerations: antiemetics,   Post-op plan: routine recovery      Results for orders placed or performed during the hospital encounter of 05/28/19   POCT Pregnancy (Beta-hCG, Qual), Urine (Point of Care) on DOS   Result Value Ref Range    POC Preg, Urine Negative Negative    POCT Kit Lot Number [id number]     POCT Kit Expiration Date 07 2020     Pos Control  Valid Control    Neg Control  Valid Control    Dipstick Lot Number      Dipstick Expiration Date      POC Specific Gravity, Urine       Lab Results   Component Value Date    WBC 5.7 05/16/2019    HGB 12.9 05/23/2019    HCT 37.3 05/16/2019    MCV 87 05/16/2019     05/16/2019

## 2021-05-29 NOTE — ANESTHESIA CARE TRANSFER NOTE
Last vitals:   Vitals:    05/28/19 1206   BP: 165/76   Pulse: 88   Resp: 16   Temp: 36.4  C (97.5  F)   SpO2: 100%     Patient's level of consciousness is drowsy  Spontaneous respirations: yes  Maintains airway independently: yes  Dentition unchanged: yes  Oropharynx: oropharynx clear of all foreign objects    QCDR Measures:  ASA# 20 - Surgical Safety Checklist: WHO surgical safety checklist completed prior to induction    PQRS# 430 - Adult PONV Prevention: 4558F - Pt received => 2 anti-emetic agents (different classes) preop & intraop  ASA# 8 - Peds PONV Prevention: NA - Not pediatric patient, not GA or 2 or more risk factors NOT present  PQRS# 424 - Jaycee-op Temp Management: 4559F - At least one body temp DOCUMENTED => 35.5C or 95.9F within required timeframe  PQRS# 426 - PACU Transfer Protocol: - Transfer of care checklist used  ASA# 14 - Acute Post-op Pain: ASA14B - Patient did NOT experience pain >= 7 out of 10

## 2021-05-29 NOTE — ANESTHESIA POSTPROCEDURE EVALUATION
Patient: Ava Garcia  CHOLECYSTECTOMY, LAPAROSCOPIC  Anesthesia type: general    Patient location: Phase II Recovery  Last vitals:   Vitals Value Taken Time   /73 5/28/2019  1:30 PM   Temp  5/28/2019  1:41 PM   Pulse 66 5/28/2019  1:38 PM   Resp 20 5/28/2019 12:45 PM   SpO2 98 % 5/28/2019  1:38 PM   Vitals shown include unvalidated device data.  Post vital signs: stable  Level of consciousness: awake and responds to simple questions  Post-anesthesia pain: pain controlled  Post-anesthesia nausea and vomiting: no  Pulmonary: unassisted, return to baseline  Cardiovascular: stable and blood pressure at baseline  Hydration: adequate  Anesthetic events: no    QCDR Measures:  ASA# 11 - Jaycee-op Cardiac Arrest: ASA11B - Patient did NOT experience unanticipated cardiac arrest  ASA# 12 - Jaycee-op Mortality Rate: ASA12B - Patient did NOT die  ASA# 13 - PACU Re-Intubation Rate: ASA13B - Patient did NOT require a new airway mgmt  ASA# 10 - Composite Anes Safety: ASA10A - No serious adverse event    Additional Notes:

## 2021-05-30 NOTE — PROGRESS NOTES
Subjective:      Ava Garcia is a 23 y.o. female who presents for evaluation of Nexplanon insertion.  She would like Nexplanon for contraception.  She has discussed this previously with her public health nurse.  She has had Nexplanon in the past.  She has no questions.  She denies any unprotected sex within the past 2 weeks.    Patient Active Problem List   Diagnosis     Type 2 diabetes mellitus (H)     Vitamin D deficiency     Morbid obesity (H)     Biliary colic       Current Outpatient Medications:      cholecalciferol, vitamin D3, 50,000 unit Tab, Take 1 tablet by mouth once a week., Disp: 12 tablet, Rfl: 3     oxyCODONE-acetaminophen (PERCOCET/ENDOCET) 5-325 mg per tablet, Take 1 tablet by mouth every 6 (six) hours as needed., Disp: 13 tablet, Rfl: 0     oxyCODONE-acetaminophen (PERCOCET/ENDOCET) 5-325 mg per tablet, Take 1-2 tablets by mouth every 6 (six) hours as needed for pain., Disp: 20 tablet, Rfl: 0     prenatal vit-iron fum-folic ac (PRENATAL VITAMIN) 27 mg iron- 0.8 mg Tab, Take 1 tablet by mouth once daily., Disp: 100 tablet, Rfl: 3     Objective:     Allergies:  Patient has no known allergies.    Vitals:  Vitals:    07/16/19 1306   BP: 122/88   Pulse: 76   Resp: 16     Body mass index is 41.43 kg/m .    Vital signs reviewed.  General: Patient is alert and oriented x 3, in no apparent distress    Procedure:  Left upper inner arm was adequately anesthetized with 2.5 cc of lidocaine with Epi.  Then, using sterile technique, Nexplanon was inserted and steve was deployed without difficulty.  Nexplanon steve was palpable subcutaneously by myself.  Insertion site was covered with a band-aid and area was wrapped with a pressure bandage.  Patient was neurovascularly intact after exam.  Proper wound aftercare was dicussed with patient.    Results for orders placed or performed in visit on 07/16/19   Pregnancy (Beta-hCG, Qual), Urine   Result Value Ref Range    Pregnancy Test, Urine Negative Negative        Assessment and Plan:   1. Nexplanon Insertion  Insertion Date: 07/16/19  3 Year Expiration Date: 07/16/22  Consent form was reviewed with patient, signed, and will be scanned in to her chart.  She knows to use back-up birth control for the next 1 week.       This dictation uses voice recognition software, which may contain typographical errors.

## 2021-05-31 VITALS — BODY MASS INDEX: 40.83 KG/M2 | WEIGHT: 260.13 LBS | HEIGHT: 67 IN

## 2021-05-31 VITALS — BODY MASS INDEX: 40.72 KG/M2 | WEIGHT: 260 LBS

## 2021-05-31 NOTE — TELEPHONE ENCOUNTER
Attempted to call patient to inform about medication. Received a busy ysabel tone. Will try again later.

## 2021-05-31 NOTE — TELEPHONE ENCOUNTER
Question following Office Visit  When did you see your provider: today  What is your question: I would like the medication for Trichomonas send to [facility] in [facility] on [address].  Patient hung up prior to asking if patient was breastfeeding. Writer returned call and left voice message to return the call to the clinic.   Okay to leave a detailed message: Yes

## 2021-05-31 NOTE — PROGRESS NOTES
Subjective:      Ava Garcia is a 23 y.o. female who presents for evaluation of vaginal discharge.  She has had vaginal discharge, itchiness, and odor.  Symptoms have been present for about 1 week.  She has not used any over-the-counter remedies to treat this.  No recent antibiotic use.  She notes she did have a Pap test done when she was pregnant.  It was normal per her report.    Patient Active Problem List   Diagnosis     Type 2 diabetes mellitus (H)     Vitamin D deficiency     Morbid obesity (H)     Biliary colic     Nexplanon insertion (7/16/19)        Current Outpatient Medications:      cholecalciferol, vitamin D3, 50,000 unit Tab, Take 1 tablet by mouth once a week., Disp: 12 tablet, Rfl: 3     oxyCODONE-acetaminophen (PERCOCET/ENDOCET) 5-325 mg per tablet, Take 1 tablet by mouth every 6 (six) hours as needed., Disp: 13 tablet, Rfl: 0     oxyCODONE-acetaminophen (PERCOCET/ENDOCET) 5-325 mg per tablet, Take 1-2 tablets by mouth every 6 (six) hours as needed for pain., Disp: 20 tablet, Rfl: 0     prenatal vit-iron fum-folic ac (PRENATAL VITAMIN) 27 mg iron- 0.8 mg Tab, Take 1 tablet by mouth once daily., Disp: 100 tablet, Rfl: 3    Current Facility-Administered Medications:      lidocaine 1%-EPINEPHrine 1:100,000 1 %-1:100,000 injection 2.5 mL (XYLOCAINE W/EPI), 2.5 mL, Other, Once, Anna Kim PA-C    Objective:     Allergies:  Patient has no known allergies.    Vitals:    08/20/19 0826   BP: 108/72   Pulse: 76   Resp: 20     Body mass index is 40.68 kg/m .    General: Alert and oriented x 3, in no apparent distress  Genitourinary: External genitalia is normal in appearance, vaginal walls are healthy, cervix is not visualized, moderate yellow tinted discharge  Exam somewhat limited due to patient's discomfort    Results for orders placed or performed in visit on 08/20/19   Wet Prep, Vaginal   Result Value Ref Range    Yeast Result No yeast seen No yeast seen    Trichomonas Trichomonas seen  (!) No Trichomonas seen    Clue Cells, Wet Prep No Clue cells seen No Clue cells seen   Other labs pending.    Assessment and Plan:     1.  Vaginal discharge.  2.  Trichomonas infection.  I will follow-up with other pending results.  Patient notified of positive trichomonas test results via [employer].  I advised her to notify any recent partners that should they should also get tested and treated, and that she should not have intercourse with these partners until they are treated.  She has a 4-month-old.  I'm not sure if she is breast-feeding.  I sent a message to her inquiring about this information.  Once we have that information I will send an antibiotic to her pharmacy.    3.  Healthcare maintenance.  Patient reports normal Pap done in 2018, during her pregnancy.    This dictation uses voice recognition software, which may contain typographical errors.

## 2021-05-31 NOTE — TELEPHONE ENCOUNTER
Called and spoke to patient. Patient stated she is not breastfeeding and would like Rx to be sent to [facility] on [address]

## 2021-06-02 VITALS — WEIGHT: 265 LBS | BODY MASS INDEX: 41.59 KG/M2 | HEIGHT: 67 IN

## 2021-06-02 VITALS — HEIGHT: 67 IN | WEIGHT: 244 LBS | BODY MASS INDEX: 38.3 KG/M2

## 2021-06-02 NOTE — TELEPHONE ENCOUNTER
Call from pt       Need to re-route eye gtts Rx to alt pharmacy      Would like sent to [address]       [phone]        Re-routed there       Call if other issues         Sameer Steinberg RN   Triage and Medication Refills

## 2021-06-02 NOTE — PROGRESS NOTES
Assessment/Plan:         Ava was seen today for eye problem.    Diagnoses and all orders for this visit:    Bacterial conjunctivitis: erythema, injection, discharge of left eye. Will treat as presumed bacterial conjunctivitis, though her kids had something similar which may suggest viral, the discharge and swelling are significant enough to warrant presumptive treatment and close monitoring. Sent polytrim eye drops.   -     Polymyxin B-trimethoprim (FOR POLYTRIM) 10,000 unit- 1 mg/mL Drop ophthalmic drops; 1-2 drops to the affected eye(s) 4 (four) times a day for 7 days                Plan of care was discussed with the patient and/or guardian. They verbalize understanding of the treatment options and plan of care.    Carline Rojas       Subjective:        Ava Garcia is a 24 y.o. female who presents for left eye redness.   2 days of left eye swelling, redness, discharge. Minimal but some photophobia. No real pain, just irritated feeling. Feels like there is constantly a film on the eye she has to wipe away.  She has not had any other viral symptoms such as rhinorrhea or cough.  Child had pink eye recently that needed to be treated.   Does not wear contacts.  Works in food services.     She is otherwise at baseline well, though has diabetes- diet controlled, takes no eye related meds. NKDA. No previous eye surgery or issues.          Objective:       Blood pressure 133/84, pulse 87, temperature 98.1  F (36.7  C), temperature source Oral, resp. rate 18, weight (!) 263 lb (119.3 kg), last menstrual period 10/20/2019, SpO2 97 %, unknown if currently breastfeeding.   Gen: well apeparing overall, no acute distress.  Eye: left eye is erythematous, significant yellow-green discharge, mild swelling of the upper and lower lids. No noted photophobia. EOMs intact. Vision 20/20 bilaterally with her glasses on.

## 2021-06-03 VITALS — BODY MASS INDEX: 40.92 KG/M2 | WEIGHT: 260.75 LBS | HEIGHT: 67 IN

## 2021-06-03 VITALS
TEMPERATURE: 98.1 F | RESPIRATION RATE: 18 BRPM | OXYGEN SATURATION: 97 % | HEART RATE: 87 BPM | WEIGHT: 263 LBS | SYSTOLIC BLOOD PRESSURE: 133 MMHG | BODY MASS INDEX: 41.04 KG/M2 | DIASTOLIC BLOOD PRESSURE: 84 MMHG

## 2021-06-03 VITALS
HEART RATE: 88 BPM | BODY MASS INDEX: 40.26 KG/M2 | WEIGHT: 258 LBS | SYSTOLIC BLOOD PRESSURE: 130 MMHG | DIASTOLIC BLOOD PRESSURE: 82 MMHG | TEMPERATURE: 98.4 F

## 2021-06-03 VITALS — BODY MASS INDEX: 40.68 KG/M2 | WEIGHT: 260.69 LBS

## 2021-06-03 VITALS — WEIGHT: 265.5 LBS | HEIGHT: 67 IN | BODY MASS INDEX: 41.67 KG/M2

## 2021-06-03 VITALS — BODY MASS INDEX: 40.25 KG/M2 | WEIGHT: 257 LBS

## 2021-06-03 VITALS — WEIGHT: 256 LBS | BODY MASS INDEX: 40.18 KG/M2 | HEIGHT: 67 IN

## 2021-06-03 NOTE — TELEPHONE ENCOUNTER
Called and spoke with Ava Garcia , Message was given, Ava Garcia  understood, no further questions.

## 2021-06-03 NOTE — PROGRESS NOTES
[facility] Office Visit    Chief Complaint:  Chief Complaint   Patient presents with     Concerns     painful urination, concerned about STD     Concerns     getting a period every four days with nexplanon     Eye Exam     done [facility]         Assessment/Plan:  1. Type 2 diabetes mellitus without complication, without long-term current use of insulin (H)  Un Controlled.  Addressed smoking status and aspirin therapy.  Recommended annual eye exam and dental cares. Reviewed foot cares and foot exam.  Blood pressure and lipid management reviewed today.  Vaccines reviewed and updated.  Plan for glucose management includes ongoing focus on healthy diabetic diet and increased activity, pt has been off meds since her pregnancy ending 4/2019.  Was advised to restart her metformin but pt never got this started again postpartum.  No having polyuria.  Restart metformin and diabetes education.  Labs ordered as below including:     Lab Results   Component Value Date    HGBA1C 13.2 (H) 11/14/2019   , No results found for: LDL,   Lab Results   Component Value Date    CREATININE 0.79 05/16/2019       - Microalbumin, Random Urine  - Glycosylated Hemoglobin A1c  - LDL Cholesterol, Direct    2. Vaccine counseling  - Tdap vaccine,  6yo or older,  IM  - Pneumococcal polysaccharide vaccine 23-valent 1 yo or older, subq/IM    3. Painful urination  H/o trichomonas recently -recheck for this again.  Polyuria likely related to hyperglycemia with uncontrolled diabetes.  Check for other STIs.   - Wet Prep, Vaginal  - Chlamydia trachomatis & Neisseria gonorrhoeae, Amplified Detection  - Urinalysis-UC if Indicated  - HIV Antigen/Antibody Screening [place]  - Syphilis Screen, [place]  - Hepatitis C Antibody (Anti-HCV)  - Culture, Urine    4. Dysfunctional uterine bleeding  Likely related to progesterone from nexplanon.  Check thyroid function.  OCPs x 3 months to stabalize uterine lining.    - Thyroid  [facility]  - desog-e.estradiol/e.estradiol (KARIVA) 0.15-0.02 mgx21 /0.01 mg x 5 per tablet; Take 1 tablet by mouth daily.  Dispense: 84 tablet; Refill: 0    5. Vitamin D deficiency  Check level and replace as needed.    - Vitamin D, Total (25-Hydroxy)    6. Need for influenza vaccination  - Influenza, Seasonal Quad, PF =/> 6months    7. Trichomonas infection  Found again- advised pt to use condoms with partner and to make sure he gets in to get checked and treated.    - metroNIDAZOLE (FLAGYL) 500 MG tablet; Take 4 pills by mouth once.  Dispense: 4 tablet; Refill: 0      Return in about 2 weeks (around 11/28/2019) for Recheck with Anna.  The following high BMI interventions were performed this visit: encouragement to exercise and lifestyle education regarding diet    Patient Education/AVS:  There are no Patient Instructions on file for this visit.    HPI:   Ava Garcia is a 24 y.o. female c/o possible STD or UTI.  Notes some burning with urination.  No vaginal odor or d/c noted lately. Frequent urination.  Sx started a few days ago with last episode of bleeding.      Had nexplanon placed 7/2019.  Had this before her pregnancy and loved it and had no problems with it.  Since placed no bleeding for 3 weeks and irregular bleeding.  Will bleed 2-5 days and then 4 days off and then bleed again.  No cramping.      ROS:  Constitutional, CV, Resp, GI, , MSK, skin, neuro, psych all negative except as outlined in the HPI above and patient denies any other symptoms.      History summarized from1-2:preop 5/2019 pt found to have a1c 7 and advised to restart her metformin postpartum.    STD exposure 8/2019- found to have trich- treated with metro 2000g  Lab tests reviewed-1: 2019    Physical Exam:  /82 (Patient Site: Right Arm, Patient Position: Sitting, Cuff Size: Adult Large)   Pulse 88   Temp 98.4  F (36.9  C) (Oral)   Wt (!) 258 lb (117 kg)   LMP 10/20/2019   BMI 40.26 kg/m   Body mass index is 40.26 kg/m .  Patient's last menstrual period was 10/20/2019.  Vital signs reviewed  Wt Readings from Last 3 Encounters:   11/14/19 (!) 258 lb (117 kg)   10/23/19 (!) 263 lb (119.3 kg)   08/20/19 (!) 260 lb 11 oz (118.2 kg)     Social History     Tobacco Use   Smoking Status Never Smoker   Smokeless Tobacco Never Used     Social History     Substance and Sexual Activity   Sexual Activity Yes     Partners: Male     Birth control/protection: None, Implant     No data recorded  No data recorded  PHQ-2 Total Score: 0 (8/20/2019  8:26 AM)    No data recorded    All normal as below except abnormalities include: pt appears well.      GYN:  Normal external genitalia.  Healthy normal vaginal mucosa. Bleeding from cervical os as reported by pt.  Health appearing cervix.  Testing obtained without pain or difficulty.  Normal bimanual exam.    General is a  24 y.o. female sitting comfortably in no apparent distress.   Abd:  +BS, soft NT/ND,  No masses or organomegally  Extremities: Warm, No Edema, 2+ Pedal and radial pulses bilaterally  Skin: No lesions or rashes noted  Neuro/MSK: Able to ambulate around the exam room with equal movement, strength and normal coordination of the upper and lower extremeties symmetrically    Results for orders placed or performed in visit on 11/14/19   Wet Prep, Vaginal   Result Value Ref Range    Yeast Result No yeast seen No yeast seen    Trichomonas Trichomonas seen (!) No Trichomonas seen    Clue Cells, Wet Prep No Clue cells seen No Clue cells seen   Glycosylated Hemoglobin A1c   Result Value Ref Range    Hemoglobin A1c 13.2 (H) 3.5 - 6.0 %   Urinalysis-UC if Indicated   Result Value Ref Range    Color, UA Yellow Colorless, Yellow, Straw, Light Yellow    Clarity, UA Cloudy (!) Clear    Glucose, UA >=1000 mg/dL (!) Negative    Bilirubin, UA Negative Negative    Ketones, UA Negative Negative    Specific Gravity, UA 1.015 1.005 - 1.030    Blood, UA Moderate (!) Negative    pH, UA 6.0 5.0 - 8.0    Protein, UA  Trace (!) Negative mg/dL    Urobilinogen, UA 0.2 E.U./dL 0.2 E.U./dL, 1.0 E.U./dL    Nitrite, UA Negative Negative    Leukocytes, UA Small (!) Negative    Bacteria, UA Few (!) None Seen hpf    RBC, UA 5-10 (!) None Seen, 0-2 hpf    WBC, UA 5-10 (!) None Seen, 0-5 hpf    Squam Epithel, UA 5-10 (!) None Seen, 0-5 lpf    Mucus, UA Few (!) None Seen lpf    Trichomonas, UA Present (!) None Seen       Med list and active problem list reviewed and updated as part of this encounter    Current Outpatient Medications on File Prior to Visit   Medication Sig Dispense Refill     [DISCONTINUED] cholecalciferol, vitamin D3, 50,000 unit Tab Take 1 tablet by mouth once a week. 12 tablet 3     [DISCONTINUED] metroNIDAZOLE (FLAGYL) 500 MG tablet Take 4 pills by mouth once. 4 tablet 0     [DISCONTINUED] oxyCODONE-acetaminophen (PERCOCET/ENDOCET) 5-325 mg per tablet Take 1 tablet by mouth every 6 (six) hours as needed. 13 tablet 0     [DISCONTINUED] oxyCODONE-acetaminophen (PERCOCET/ENDOCET) 5-325 mg per tablet Take 1-2 tablets by mouth every 6 (six) hours as needed for pain. 20 tablet 0     [DISCONTINUED] polymyxin B-trimethoprim (FOR POLYTRIM) 10,000 unit- 1 mg/mL Drop ophthalmic drops 1-2 drops to the affected eye(s) 4 (four) times a day for 7 days 1 Bottle 0     Current Facility-Administered Medications on File Prior to Visit   Medication Dose Route Frequency Provider Last Rate Last Dose     [DISCONTINUED] lidocaine 1%-EPINEPHrine 1:100,000 1 %-1:100,000 injection 2.5 mL (XYLOCAINE W/EPI)  2.5 mL Other Once Anna Kim PA-C Sarah M. Hammes, MD

## 2021-06-03 NOTE — TELEPHONE ENCOUNTER
Please call pt-    I sent her a message last week about her trichomonas infection- I hope she got the treatment for this.    She also has a urine infection- I sent a prescription in for an antibiotic to take 3x/day for 10 days.      Her diabetes is VERY high- this is probably why she got a urine infection and is peeing so much.     She should restart her metformin twice a day.  I sent a refill in to the pharmacy.      No other sexual infections.    She should be sure to see Anna in the next month for a recheck.

## 2021-06-04 VITALS
BODY MASS INDEX: 39.06 KG/M2 | DIASTOLIC BLOOD PRESSURE: 88 MMHG | RESPIRATION RATE: 16 BRPM | HEIGHT: 68 IN | HEART RATE: 80 BPM | WEIGHT: 257.75 LBS | SYSTOLIC BLOOD PRESSURE: 134 MMHG

## 2021-06-04 VITALS
BODY MASS INDEX: 39.63 KG/M2 | WEIGHT: 254 LBS | RESPIRATION RATE: 14 BRPM | DIASTOLIC BLOOD PRESSURE: 84 MMHG | HEART RATE: 74 BPM | SYSTOLIC BLOOD PRESSURE: 128 MMHG

## 2021-06-04 VITALS
BODY MASS INDEX: 40.57 KG/M2 | HEART RATE: 90 BPM | DIASTOLIC BLOOD PRESSURE: 78 MMHG | WEIGHT: 260 LBS | SYSTOLIC BLOOD PRESSURE: 110 MMHG | RESPIRATION RATE: 24 BRPM

## 2021-06-04 NOTE — PROGRESS NOTES
Subjective:      Ava Garcia is a 24 y.o. female who presents for follow-up of multiple concerns:  Multiple issues reviewed and treated with Dr. Richardson at their last visit on 11/14/2019.  I reviewed that note today.  I also reviewed all test results done on that day.  Diagnosis of type 2 diabetes, had not really been followed up on after her delivery.  Uncontrolled.  Dr. Richardson restarted metformin and ordered diabetes education.  Patient states she has an appointment coming up for this.  Urinary frequency, certainly due to in part to diabetes, also UTI was discovered and treated with Keflex.  Her vitamin D was low when she started vitamin D pills.  She also continued to have trichomonas.  She verifies she took metronidazole and her partner did as well.  She thinks she may have either ongoing trichomonas or another type of infection.  She says she is having vaginal itching and discharge.  She reports she is taking metformin twice a day.  She notes initially she had some nausea and diarrhea.  That has improved a fair amount since she has been taking it longer.  She does not have a meter at home to check her sugars.    Patient Active Problem List   Diagnosis     Type 2 diabetes mellitus (H)     Vitamin D deficiency     Morbid obesity (H)     Nexplanon insertion (7/16/19)     Trichomonas infection     Urinary tract infection        Current Outpatient Medications:      desog-e.estradiol/e.estradiol (KARIVA) 0.15-0.02 mgx21 /0.01 mg x 5 per tablet, Take 1 tablet by mouth daily., Disp: 84 tablet, Rfl: 0     metFORMIN (GLUCOPHAGE) 500 MG tablet, Take 1 tablet (500 mg total) by mouth 2 (two) times a day with meals., Disp: 180 tablet, Rfl: 3     metroNIDAZOLE (FLAGYL) 500 MG tablet, Take 4 pills by mouth once., Disp: 4 tablet, Rfl: 0     fluconazole (DIFLUCAN) 150 MG tablet, Take 1 tablet (150 mg total) by mouth once for 1 dose. TAKE SECOND PILL IN 3-5 DAYS, IF NEEDED., Disp: 1 tablet, Rfl: 0    Objective:      Allergies:  Patient has no known allergies.    Vitals:    12/16/19 1152   BP: 110/78   Pulse: 90   Resp: 24     Body mass index is 40.57 kg/m .    General: Alert and oriented x 3, in no apparent distress  Genitourinary: External genitalia is normal in appearance, vaginal walls are healthy, cervix is well visualized and normal in appearance, no significant discharge noted    Results for orders placed or performed in visit on 12/16/19   Wet Prep, Vaginal   Result Value Ref Range    Yeast Result No yeast seen No yeast seen    Trichomonas Trichomonas seen (!) No Trichomonas seen    Clue Cells, Wet Prep No Clue cells seen No Clue cells seen   Other labs pending.    Assessment and Plan:     1. Yeast infection of the vagina  Initially suspected yeast infection based on symptoms, uncontrolled diabetes, and recent antibiotic use.  Prescription sent for fluconazole.  - fluconazole (DIFLUCAN) 150 MG tablet; Take 1 tablet (150 mg total) by mouth once for 1 dose. TAKE SECOND PILL IN 3-5 DAYS, IF NEEDED.  Dispense: 1 tablet; Refill: 0    2. Trichomonas infection  Trichomonas positive today.  She has had positive trichomonas tests in November 2019 and August 2019.  She verifies that she and her partner did take metronidazole prescribed at her last visit 1 month ago.  Patient will be notified of continued positive test.  Plan on treating for 7 days of metronidazole.  We will also see if her partner needs to be treated again.  We will follow-up with pending gonorrhea chlamydia testing.  - Wet Prep, Vaginal  - Chlamydia trachomatis & Neisseria gonorrhoeae, Amplified Detection    3. Type 2 diabetes mellitus without complication, without long-term current use of insulin (H)  She is restarted metformin.  Has not been able to check her sugars yet.  She reports she has a diabetic education visit soon.  I reviewed we could do provider could review how to check sugars at that visit.  She has an eye exam scheduled soon.  Overall  tolerating metformin well.    4. Urinary tract infection without hematuria, site unspecified  She took antibiotics for UTI.  No acute urinary symptoms today.  Urinalysis not done today.  Continue to monitor.    5. Vitamin D deficiency  She has vitamin D to take at home.    This dictation uses voice recognition software, which may contain typographical errors.

## 2021-06-05 VITALS
HEART RATE: 89 BPM | BODY MASS INDEX: 39.97 KG/M2 | SYSTOLIC BLOOD PRESSURE: 126 MMHG | WEIGHT: 259 LBS | DIASTOLIC BLOOD PRESSURE: 79 MMHG

## 2021-06-05 VITALS
BODY MASS INDEX: 39.81 KG/M2 | DIASTOLIC BLOOD PRESSURE: 68 MMHG | SYSTOLIC BLOOD PRESSURE: 128 MMHG | WEIGHT: 258 LBS | HEART RATE: 80 BPM

## 2021-06-06 NOTE — TELEPHONE ENCOUNTER
Attempted to call the pt to see if she is planning on coming in to the clinic to sign the KALYAN but was unable to leave a message. I checked at the  and the KALYAN form is still at the .    Anna, what would you like us to do next?

## 2021-06-06 NOTE — TELEPHONE ENCOUNTER
Called and spoke with Ava Garcia, Message was given, Ava Garcia understood. Told her to come in anything today or this week to sign KALYAN for release of info.

## 2021-06-06 NOTE — TELEPHONE ENCOUNTER
Checked the  and the KALYAN is still up front. It seems like the pt have not come in to sign the form. What would you like us to do next?

## 2021-06-06 NOTE — TELEPHONE ENCOUNTER
Unable to LM at [phone] due to VM box is full . Sending letter out and postponing task out to 2 weeks and will try again if an appointment hasn't been made.

## 2021-06-06 NOTE — TELEPHONE ENCOUNTER
Called and spoke with patient. She stated she was going to a location in [place]. Inform patient we will need those records. Patient will be coming in to sign an release information form.  KALYAN form place at the .

## 2021-06-06 NOTE — TELEPHONE ENCOUNTER
----- Message from Anna Kim PA-C sent at 2/12/2020  4:41 PM CST -----  Patient says she has had a diabetic education visit in the past few months.  I looked through her chart and I cannot find a record of this.  Is there a way you could figure out if she actually had a visit, and if so where/when?  If she has not had any diabetic education visits since her last visit with Dr. Richardson on 11/14/2019, I can put in a referral to get her started on this ASAP.

## 2021-06-06 NOTE — PROGRESS NOTES
Subjective:      Ava Garcia is a 24 y.o. female who presents for follow-up of trichomonas infection.  I last saw her for follow-up on this on 12/16/2019.  She has been treated several times in the past several months with metronidazole 2 g oral once.  We have checked her for trichomonas multiple times since 8/20/2019.  Every time we have tested it has been positive.  There has been some question in the past as to whether her partner has been treated, or whether she and partner have been treated at the same time.  Most recently I resent medicines for her and her partner about 2 weeks ago.  She notes that they did both wait and take the medicine at the same time.  They have not had intercourse since.  She does not think the medicine has worked.  She still is having the same discharge and other symptoms that she has had in the past.  She verifies she only has 1 partner.    Patient Active Problem List   Diagnosis     Type 2 diabetes mellitus (H)     Vitamin D deficiency     Morbid obesity (H)     Nexplanon insertion (7/16/19)     Trichomonas infection     Urinary tract infection       Current Outpatient Medications:      desog-e.estradiol/e.estradiol (KARIVA) 0.15-0.02 mgx21 /0.01 mg x 5 per tablet, Take 1 tablet by mouth daily., Disp: 84 tablet, Rfl: 4     metFORMIN (GLUCOPHAGE) 500 MG tablet, Take 1 tablet (500 mg total) by mouth 2 (two) times a day with meals., Disp: 180 tablet, Rfl: 3     metroNIDAZOLE (FLAGYL) 500 MG tablet, Take 4 pills by mouth once a day for 7 days., Disp: 28 tablet, Rfl: 1     Objective:     Allergies:  Patient has no known allergies.    Vitals:  Vitals:    02/12/20 1105   BP: 128/84   Pulse: 74   Resp: 14     Body mass index is 39.63 kg/m .    Vital signs reviewed.  General: Patient is alert and oriented x 3, in no apparent distress    Results for orders placed or performed in visit on 02/12/20   Wet Prep, Vaginal   Result Value Ref Range    Yeast Result No yeast seen No yeast seen     Trichomonas Trichomonas seen (!) No Trichomonas seen    Clue Cells, Wet Prep No Clue cells seen No Clue cells seen   HIV Antigen/Antibody Screening [place]   Result Value Ref Range    HIV Antigen / Antibody Negative Negative   Wet prep was self collected.  I will follow-up with other pending labs.    Assessment and Plan:   1.  Follow-up trichomonas infection.  She has had trichomonas infection since August 2019.  It is possible she may have cleared it at times and then got reinfected.  There is been question in the past of her taking the medicine at the same time as her partner.  She verifies that 2 weeks ago, when the most recent prescription was sent, she and her partner did both take it at the same time.  They have not intercourse since.  Patient notes symptoms have not improved.  Trichomonas was again positive today.  There is a small possibility that testing was done too soon, and that infection is treated remains positive for short while.  However, given patient history of illness, I would like to retreat.  In reviewing literature, 1 option for resistant/recurrent treatment is 2 g metronidazole orally daily for 7 days.  Prescription was sent for this for patient and her partner.  I recommended she come back in 1 month for test for cure.    2.  STD screen.  Patient will be informed of results when available.    3.  Type 2 diabetes uncontrolled.  4.  Morbid obesity.  Patient verifies she did have a diabetes education visit.  I cannot find a report of that in her chart.  We will have staff verify appointment, or new referral can be placed if needed.  Lab Results   Component Value Date    HGBA1C 13.2 (H) 11/14/2019   If she has not had a diabetic check recently, would recommend she come back in March for diabetic check with me or other provider.    5.  Irregular uterine bleeding.  Patient was taking oral birth control pills in addition to Nexplanon, in hopes of regulating her bleeding patterns.  She says that she  is no longer taking birth control pills.  No other significant concerns.    This dictation uses voice recognition software, which may contain typographical errors.

## 2021-06-06 NOTE — TELEPHONE ENCOUNTER
Postponing out to June 2020 due to our priority is to keep all our patients and staff safe by following guidelines from the [place] [employer]PARTMENT OF HEALTH.

## 2021-06-06 NOTE — TELEPHONE ENCOUNTER
Will review at next office visit.    ----  She should come in to see me for diabetes check in March

## 2021-06-08 NOTE — TELEPHONE ENCOUNTER
I sent refill for her.  She should come in for face 2 face visit in July to make sure infection is gone.  Do not drink alcohol when taking this medicine.

## 2021-06-09 NOTE — TELEPHONE ENCOUNTER
Although, she has had trichomonas several times recently, so if she wants to check if that is gone she needs face to face visit

## 2021-06-10 NOTE — PROGRESS NOTES
"Chief Complaint:  Chief Complaint   Patient presents with     Urinary Tract Infection     sx: itchy      HPI:   Ava Garcia is a 24 y.o. female with chief complaint of vaginal discomfort.  Outside of her labia feels swollen and itchy symptoms of UTI, but that has gone away.  No hematuria.  She has tested positive for trichomonas 4 times in the past year.  Few of the instances involved her partner got not getting treated for messing up the order.  She was treated with extended course of metronidazole and that did not seem to help.  Today she verifies she did take her last course of antibiotics and partner did as well.    Checking her blood sugars and they have been \"okay.\"  She is taking metformin 1 pill twice daily.    Patient Active Problem List   Diagnosis     Type 2 diabetes mellitus (H)     Vitamin D deficiency     Morbid obesity (H)     Nexplanon insertion (7/16/19)     Trichomonas infection     Urinary tract infection       Current Outpatient Medications:      desog-e.estradiol/e.estradiol (KARIVA) 0.15-0.02 mgx21 /0.01 mg x 5 per tablet, Take 1 tablet by mouth daily., Disp: 84 tablet, Rfl: 4     metFORMIN (GLUCOPHAGE) 500 MG tablet, Take 2 tablets (1,000 mg total) by mouth 2 (two) times a day with meals., Disp: 120 tablet, Rfl: 5     tinidazole (TINDAMAX) 500 MG tablet, Take 4 tablets (2,000 mg total) by mouth daily with breakfast for 7 days., Disp: 28 tablet, Rfl: 0      Tobacco Use      Smoking status: Never Smoker      Smokeless tobacco: Never Used      Objective:  Allergies:  No Known Allergies    Vitals:    08/17/20 1039   BP: 134/88   Patient Site: Left Arm   Patient Position: Sitting   Cuff Size: Adult Large   Pulse: 80   Resp: 16   Weight: (!) 257 lb 12 oz (116.9 kg)   Height: 5' 7.5\" (1.715 m)     Body mass index is 39.77 kg/m .    Vital signs reviewed  General: Patient is alert and oriented x 3, in no apparent distress  Genitourinary: External genitalia is normal in appearance, vaginal walls " are healthy, cervix is fairly well visualized and normal in appearance, minimal green/yellow discharge noted      Results for orders placed or performed in visit on 08/17/20   Wet Prep, Vaginal    Specimen: Genital   Result Value Ref Range    Yeast Result No yeast seen No yeast seen    Trichomonas Trichomonas seen (!) No Trichomonas seen    Clue Cells, Wet Prep No Clue cells seen No Clue cells seen   Urinalysis-UC if Indicated   Result Value Ref Range    Color, UA Yellow Colorless, Yellow, Straw, Light Yellow    Clarity, UA Clear Clear    Glucose, UA >=1000 mg/dL (!) Negative    Bilirubin, UA Negative Negative    Ketones, UA Negative Negative    Specific Gravity, UA 1.020 1.005 - 1.030    Blood, UA Moderate (!) Negative    pH, UA 6.0 5.0 - 8.0    Protein, UA Negative Negative mg/dL    Urobilinogen, UA 0.2 E.U./dL 0.2 E.U./dL, 1.0 E.U./dL    Nitrite, UA Negative Negative    Leukocytes, UA Small (!) Negative    Bacteria, UA Moderate (!) None Seen hpf    RBC, UA 0-2 None Seen, 0-2 hpf    WBC, UA 5-10 (!) None Seen, 0-5 hpf    Squam Epithel, UA 10-25 (!) None Seen, 0-5 lpf   Glycosylated Hemoglobin A1c   Result Value Ref Range    Hemoglobin A1c 11.3 (H) <=5.6 %   Basic Metabolic Panel   Result Value Ref Range    Sodium 138 136 - 145 mmol/L    Potassium 4.1 3.5 - 5.0 mmol/L    Chloride 103 98 - 107 mmol/L    CO2 23 22 - 31 mmol/L    Anion Gap, Calculation 12 5 - 18 mmol/L    Glucose 263 (H) 70 - 125 mg/dL    Calcium 9.9 8.5 - 10.5 mg/dL    BUN 8 8 - 22 mg/dL    Creatinine 0.83 0.60 - 1.10 mg/dL    GFR MDRD Af Amer >60 >60 mL/min/1.73m2    GFR MDRD Non Af Amer >60 >60 mL/min/1.73m2   LDL Cholesterol, Direct   Result Value Ref Range    Direct  (H) <=129 mg/dl     Urine culture is pending.  Other labs pending.    Assessment and Plan:  1. Trichomonas infection  Patient has tested positive for trichomonas 5 times in the past year.  Please see HPI and previous notes for details.  She has not tried alternate antibiotic  treatment.  Prescription sent today as below.  If she fails this regimen, could consider referral to specialist.  - tinidazole (TINDAMAX) 500 MG tablet; Take 4 tablets (2,000 mg total) by mouth daily with breakfast for 7 days.  Dispense: 28 tablet; Refill: 0    2. Vaginal irritation  Likely due to trichomonas infection.  Also given her uncontrolled diabetes, yeast could be present, but not seen on wet prep today.  I will follow-up with the pending results.  - Wet Prep, Vaginal  - Chlamydia trachomatis & Neisseria gonorrhoeae, Amplified Detection    3. Possible urinary tract infection  She reports UTI symptoms have resolved.  Urinalysis a little suspicious for UTI.  Urine culture was sent and I will follow-up with results.  - Urinalysis-UC if Indicated  - Culture, Urine    4. Type 2 diabetes mellitus without complication, without long-term current use of insulin (H)  uncontrolled  A1c: 11.3%, previous A1c from 11/2019 = 13.2%  Eye Exam: no, not done today due to multiple other issues, will address at next visit  Foot Exam: no, not done today due to multiple other issues, will address at next visit  Smoking: no  On a statin: no, not started due to age  Blood Pressure: normal  Microalbumin: UTD  On ACE inhibitor: no, not started due to age  A1c has improved, but still out of range.  Start with increase of metformin to 2 pills twice a day.  New prescription sent.  Previously referred her to diabetic educator but she did not follow through.  New referral placed today.  Plan on having her return in 3 months for diabetes check.  - Glycosylated Hemoglobin A1c  - Basic Metabolic Panel  - LDL Cholesterol, Direct  - metFORMIN (GLUCOPHAGE) 500 MG tablet; Take 2 tablets (1,000 mg total) by mouth 2 (two) times a day with meals.  Dispense: 120 tablet; Refill: 5  - Ambulatory referral to Diabetes Education Program (CDE)      This dictation uses voice recognition software, which may contain typographical errors.

## 2021-06-11 NOTE — TELEPHONE ENCOUNTER
Medication Request  Medication name: ACCU-CHEK ME W/DEVICE KIT  Requested Pharmacy: [facility]  Reason for request: N/A - electronic request   When did you use medication last?:  N/A - electronic request   Patient offered appointment:  N/A - electronic request  Okay to leave a detailed message: no

## 2021-06-11 NOTE — PROGRESS NOTES
Patient showed up to [facility] at 2:00 today.   She was scheduled for 4 pm virtual call.    Will attempt to reschedule.

## 2021-06-11 NOTE — TELEPHONE ENCOUNTER
Routing to [facility] med refill pool to go through protocols.    Medication is not a hx provider.

## 2021-06-12 NOTE — PROGRESS NOTES
[facility] Follow Up Note    Assessment/Plan:  1. Annual physical exam  STD screen today. Pap once turns 21.  - Chlamydia trachomatis & Neisseria gonorrhoeae, Amplified Detection  - Hepatitis B Surface Antigen (HBsAG)  - Hepatitis C Antibody (Anti-HCV)  - HIV Antigen/Antibody Screening [place]  - Syphilis Screen, [place]    2. Type 2 diabetes mellitus  Uncontrolled. Will re-start Metformin, will try ER due to previous abdominal cramping on immediate release. F/u with DM educator. Check urine for protein. Will come back for fasting lipids. Will need ophthalmology refferal ob f/u.  - Glycosylated Hemoglobin A1c  - [facility] RED  - Ambulatory referral to Diabetic Education  - metFORMIN (GLUCOPHAGE-XR) 500 MG 24 hr tablet; One tab daily for 1 week, then 2 tabs daily for 1 week, then 3 tabs daily for 1 week, then 4 tabs daily.  Dispense: 120 tablet; Refill: 3  - blood glucose test strips; Use twice a day before meals. Dispense brand per patient's insurance at pharmacy discretion.  Dispense: 100 strip; Refill: 11  - generic lancets (FINGERSTIX LANCETS); Dispense brand per patient's insurance at pharmacy discretion.  Dispense: 100 each; Refill: 0  - Microalbumin, Random Urine  - Lipid Cascade; Future  - Thyroid Stimulating Hormone (TSH)  - Comprehensive Metabolic Panel    3. Vitamin D deficiency  - Vitamin D, Total (25-Hydroxy)    4. Foot pain, left  H/o previous bunion surgery and ongoing pain. Will refer to podiatry.  - Ambulatory referral to Podiatry    5. BMI of 40.  If diabetes is hard to control or weight gain continues, she would benefit from referal to bariatric clinic for weght loss options.    Scarlett Brock MD    Chief Complaint:  Chief Complaint   Patient presents with     Establish Care     Annual Exam     Diabetes       History of Present Illness:  Ava is a 21 y.o. female with h/o DMII, hyperlipidemia, vit D def, BMI 40, bunion surgery and chronic foor pain here to establish care and have PE  "done.    Was Dx with DMII in [place]. Was on Metformin but stopped taking and gained weight. A1C in  was 7.0, today 8.8. Pt denies drinking pop, but has problems with satiety. Eats 2ce a day. Often overeats for dinner since she skips lunch. Discussed re-starting Metformin. Will try extended release since IR gave her abd cramping. Will refer to DM educator. Pt has glucometer but no supplies.    Poblem with pain in the food where she had bunion surgery. Will refer to podiatry.    Review of Systems:  A comprehensive review of systems was performed and was otherwise negative, menses are regular.    PFSH:  Social History: reviewed  History   Smoking Status     Never Smoker   Smokeless Tobacco     Never Used     Social History     Social History Narrative    Lives at home, attends [employer]. 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       Past History: reviewed  Current Outpatient Prescriptions   Medication Sig Dispense Refill     blood glucose test (CONTOUR NEXT STRIPS) strips two times a day. Use as directed. Pharmacy dispense brand based on insurance.       ETONOGESTREL (NEXPLANON SDRM) by Subdermal route.        generic lancets (MICROLET LANCET) two times a day.       blood glucose test strips Use twice a day before meals. Dispense brand per patient's insurance at pharmacy discretion. 100 strip 11     generic lancets (FINGERSTIX LANCETS) Dispense brand per patient's insurance at pharmacy discretion. 100 each 0     metFORMIN (GLUCOPHAGE-XR) 500 MG 24 hr tablet One tab daily for 1 week, then 2 tabs daily for 1 week, then 3 tabs daily for 1 week, then 4 tabs daily. 120 tablet 3     No current facility-administered medications for this visit.        Family History: reviewed    Physical Exam:    Vitals:    17 1621   BP: 110/76   Patient Site: Left Arm   Patient Position: Sitting   Cuff Size: Adult Large   Pulse: 92   Resp: 18   SpO2: 98%   Weight: (!) 260 lb 2 oz (118 kg)   Height: 5' 7\" (1.702 m)     Wt Readings " from Last 3 Encounters:   08/07/17 (!) 260 lb 2 oz (118 kg)     Body mass index is 40.74 kg/(m^2).    Constitutional:  Reveals a pleasant female.  Vitals:  Per nursing notes.  HEENT:No cervical LAD, no thyromegaly,  conjunctiva is pink, no scleral icterus, TMs are visualized and normal bl, oropharynx is clear,but crowded,  no exudates,   Cardiac:  Regular rate and rhythm,no murmurs, rubs, or gallops.  Legs without edema. Palpation of the radial pulse regular.  Lungs: Clear to auscultation bl.  Respiratory effort normal.  Abdomen:positive BS, soft, nontender, nondistended.  No hepato-splenomagaly  Skin: mild hydradenitis in axilla.  Rheumatologic: Normal joints and nails of the hands.  Neurologic:  Cranial nerves II-XII intact.     Psychiatric: affect appropriate, memory intact.   Breast exam: normal    Data Review:    Analysis and Summary of Old Records (2): yes      Records Requested (1): no     Other History Summarized (from other people in the room) (2): mother    Radiology Tests Summarized (XRAY/CT/MRI/DXA) (1): no    Labs Reviewed (1): yes    Medicine Tests Reviewed (EKG/ECHO/COLONOSCOPY/EGD) (1): no    Independent Review of EKG or X-RAY (2): no

## 2021-06-12 NOTE — PROGRESS NOTES
Subjective:      Ava Garcia is a 25 y.o. female with chief complaint of vaginal irritation.  Recent history of trichomonas and chlamydia in August.  She has had recurrent trichomonas in the past.  After her most recent infections, she did verify that she took metronidazole and azithromycin.  Partner also got tested and treated.  She has not had any intercourse since her last positive test.  She is having a lot of itchiness in the vaginal area and she feels like her skin is looking white in that area.  No intercourse since August 2020.  No vaginal discharge.  No smell.  Her vaginal area is pretty irritated.  She notes she did have an diabetic eye exam this year at [facility].    Patient Active Problem List   Diagnosis     Type 2 diabetes mellitus (H)     Vitamin D deficiency     Morbid obesity (H)     Nexplanon insertion (7/16/19)     Trichomonas infection     Urinary tract infection        Current Outpatient Medications:      azithromycin (ZITHROMAX) 500 MG tablet, Take 2 tablets by mouth once., Disp: 2 tablet, Rfl: 0     blood glucose test (CONTOUR NEXT TEST STRIPS) strips, Use 1 each As Directed 2 (two) times a day. Dispense brand per patient's insurance at pharmacy discretion., Disp: 100 strip, Rfl: 3     blood-glucose meter (CONTOUR NEXT METER) Misc, Use 1 each As Directed 2 (two) times a day., Disp: 1 each, Rfl: 0     blood-glucose meter (CONTOUR NEXT METER) Misc, Use daily for diabetes testing, Disp: 1 each, Rfl: 0     desog-e.estradiol/e.estradiol (KARIVA) 0.15-0.02 mgx21 /0.01 mg x 5 per tablet, Take 1 tablet by mouth daily., Disp: 84 tablet, Rfl: 4     generic lancets (FINGERSTIX LANCETS), 1 each by In Vitro route 2 (two) times a day., Disp: 100 each, Rfl: 3     metFORMIN (GLUCOPHAGE) 500 MG tablet, Take 2 tablets (1,000 mg total) by mouth 2 (two) times a day with meals., Disp: 120 tablet, Rfl: 5     fluconazole (DIFLUCAN) 150 MG tablet, Take 1 tablet (150 mg total) by mouth once for 1 dose. Take  second pill in 3-5 days if needed., Disp: 1 tablet, Rfl: 0      Tobacco Use      Smoking status: Never Smoker      Smokeless tobacco: Never Used      Objective:     Allergies:  Patient has no known allergies.    Vitals:    11/05/20 1107   BP: 128/68   Pulse: 80     Body mass index is 39.81 kg/m .    General: Alert and oriented x 3, in no apparent distress  Genitourinary: External genitalia is minimally hypopigmented, vaginal walls are healthy, cervix is well visualized and normal in appearance, no significant discharge noted    Results for orders placed or performed in visit on 11/05/20   Wet Prep, Vaginal    Specimen: Vaginal; Genital   Result Value Ref Range    Yeast Result Yeast Seen (!) No yeast seen    Trichomonas No Trichomonas seen No Trichomonas seen    Clue Cells, Wet Prep No Clue cells seen No Clue cells seen   Chlamydia trachomatis & Neisseria gonorrhoeae, Amplified Detection    Specimen: Cervix, Endocervical; Body Fluid   Result Value Ref Range    Chlamydia trachomatis, Amplified Detection Negative Negative    Neisseria gonorrhoeae, Amplified Detection Negative Negative   Glycosylated Hemoglobin A1c   Result Value Ref Range    Hemoglobin A1c >14.0 (H) <=5.6 %   Microalbumin, Random Urine   Result Value Ref Range    Microalbumin, Random Urine 1.23 0.00 - 1.99 mg/dL    Creatinine, Urine 64.5 mg/dL    Microalbumin/Creatinine Ratio Random Urine 19.1 <=19.9 mg/g       Assessment and Plan:     1. Yeast infection of the vagina  Likely due to uncontrolled Type 2 Diabetes.  Will treat for now, but explained to patient that this will likely not resolve completely until sugars are under better control.  - fluconazole (DIFLUCAN) 150 MG tablet; Take 1 tablet (150 mg total) by mouth once for 1 dose. Take second pill in 3-5 days if needed.  Dispense: 1 tablet; Refill: 0    2. Type 2 diabetes mellitus without complication, without long-term current use of insulin (H)  Uncontrolled.  Not primary focus of our visit  today.  I want to confirm her medication doses.  Referral sent twice previously for DM Ed, has not done that yet.  I think that would be helpful, could also consider Endocrine consult.  - Glycosylated Hemoglobin A1c  - Microalbumin, Random Urine    3. History of STD infection  STD testing all negative.  - Wet Prep, Vaginal  - Chlamydia trachomatis & Neisseria gonorrhoeae, Amplified Detection      This dictation uses voice recognition software, which may contain typographical errors.

## 2021-06-12 NOTE — PROGRESS NOTES
Admission History & Physical  Ava Garcia, 1995, [id number]    [facility]  Scarlett Brock MD, [phone]    Extended Emergency Contact Information  Primary Emergency Contact: Gino Edward   [facility]  Home Phone: [phone]  Relation: Father     Assessment and Plan:   Assessment: Sesamoiditis left foot  Plan: I have recommended orthotics.  Active Problems:    * No active hospital problems. *      Chief Complaint:  Left foot pain     HPI:    Ava Garcia is a 21 y.o. old female who presented to the clinic today complaining of a very sharp pain on the bottom of her left foot near the big toe.  The patient stated that several years ago she had a bunion procedure performed.  She subsequently developed chronic pain underneath the ball of her left foot.  She had the screw removed.  Since that time she continues to have very sharp pain on the ball of her left foot near the big toe.  She finds it difficult to weight-bear and ambulate.  When she fully extends her big toe it creates sharp pain on the bottom of her left foot.  She has not had any significant swelling.  But she does have some mild swelling and pain periodically.  The pain is more pronounced when she first gets out of bed in the mornings.  She has no heel pain.    History is provided by patient    Medical History  Active Ambulatory (Non-Hospital) Problems    Diagnosis     Type 2 diabetes mellitus     Vitamin D deficiency     Foot pain, left     History reviewed. No pertinent past medical history.  Patient Active Problem List    Diagnosis Date Noted     Type 2 diabetes mellitus 08/07/2017     Vitamin D deficiency 08/07/2017     Foot pain, left 08/07/2017     Surgical History  She  has a past surgical history that includes Bunionectomy (2014, 2015).   Past Surgical History:   Procedure Laterality Date     BUNIONECTOMY  2014, 2015    x2    Social History  Reviewed, and she  reports that she has never smoked.  She has never used smokeless tobacco. She reports that she does not drink alcohol or use illicit drugs.  Social History   Substance Use Topics     Smoking status: Never Smoker     Smokeless tobacco: Never Used     Alcohol use No      Allergies  No Known Allergies Family History  Reviewed, and family history includes Alcohol abuse in her father; No Medical Problems in her sister; Polycystic ovary syndrome in her mother.   Psychosocial Needs  Social History     Social History Narrative    Lives at home, attends [employer]. 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     Additional psychosocial needs reviewed per nursing assessment.       Prior to Admission Medications     (Not in a hospital admission)        Review of Systems - Negative     /76  Pulse 92  Temp 98.5  F (36.9  C) (Temporal)   SpO2 98%    Objective findings: Gen.: The patient is alert and in no acute distress      Integument: Nails bilateral feet are normal length and color.  Skin bilaterally warm and intact.  There is a well-healed surgical incision on the dorsal aspect of the first metatarsal phalangeal joint left foot      Vascular: DP and PT pulses +2/4 bilateral feet  Capillary refill less than 2 seconds bilateral feet.      Neurologic: Negative clonus, negative Babinski bilateral feet.        Musculoskeletal: Range of motion within normal limits. Muscle power +5 over 5 within all compartments.  There is pain on palpation of the plantar aspect of the left foot near the sesamoidal apparatus.  There is no edema or erythema or cellulitis noted.  No crepitus on range of motion at the first metatarsal phalangeal joint left foot noted.  No palpable masses noted left foot.      Assessment: Sesamoiditis left foot       Plan: An x-ray of the left foot was taken today.  I informed the patient that the x-rays were negative for any osseous or soft tissue abnormalities. I have recommended orthotics.    I informed the patient that if the orthotics do not give her  significant relief I would recommend a tibial sesamoidectomy of her left foot to alleviate her pain.

## 2021-06-16 PROBLEM — E11.9 TYPE 2 DIABETES MELLITUS (H): Status: ACTIVE | Noted: 2017-08-07

## 2021-06-16 PROBLEM — Z30.017 NEXPLANON INSERTION: Status: ACTIVE | Noted: 2019-07-16

## 2021-06-16 PROBLEM — E66.01 MORBID OBESITY (H): Status: ACTIVE | Noted: 2019-05-23

## 2021-06-16 PROBLEM — E55.9 VITAMIN D DEFICIENCY: Status: ACTIVE | Noted: 2017-08-07

## 2021-06-16 PROBLEM — N39.0 URINARY TRACT INFECTION: Status: ACTIVE | Noted: 2019-11-18

## 2021-06-16 PROBLEM — A59.9 TRICHOMONAS INFECTION: Status: ACTIVE | Noted: 2019-08-20

## 2021-06-19 NOTE — LETTER
Letter by Carline Rojas MD at      Author: Carline Rojas MD Service: -- Author Type: --    Filed:  Encounter Date: 10/23/2019 Status: Signed         October 23, 2019     Patient: Ava Garcia   YOB: 1995   Date of Visit: 10/23/2019       To Whom it May Concern:    Ava Gacria was seen in my clinic on 10/23/2019.  Please excuse her for her absences today and tomorrow. She has contagious illness and should not return to work until Friday.    If you have any questions or concerns, please don't hesitate to call.    Sincerely,         Electronically signed by Carline Rojas MD

## 2021-06-20 NOTE — LETTER
Letter by Anna Kim PA-C at      Author: Anna Kim PA-C Service: -- Author Type: --    Filed:  Encounter Date: 3/2/2020 Status: (Other)         Ava Garcia  [address] [place] [address]      March 2, 2020      Dear Ava,    As a valued [facility] patient, your healthcare needs are our priority.  Your health care team has determined that you are due for an appointment regarding your Diabetes.    To help prevent delays in your care, please call the [facility] at [phone].    We look forward to partnering with you to achieve optimal health and wellbeing.    Sincerely,  Your care team at [facility] and [facility]

## 2021-06-21 NOTE — PROGRESS NOTES
Chief Complaint:  Chief Complaint   Patient presents with     preg consult     baby #1. had 3 positive home UPT 1.5 wk ago. LMP 2018     HPI:   Ava Garcia is a 22 y.o. female is here for pregnancy intake.  She is .  Patient's last menstrual period was 2018 (exact date).  Positive home pregnancy test on 10/5/18.  Has type 2 diabetes, diagnosed about 2 years ago.  Was taking metformin for a while, but made lifestyle changes and sugars were good, so she stopped meds on her own.  Has not taken diabetes medicine or checked her sugars in at least a year.  No recent doctor's visits for her diabetes.  Used Nexplanon, then tried patch.  Stopped patch about 4 months ago?    Past medical history: reviewed and updated.  No past medical history on file.  Past Surgical History:   Procedure Laterality Date     BUNIONECTOMY  , 2015    x2       Current Outpatient Prescriptions:      blood glucose meter (GLUCOMETER), Please provide patient with glucometer, test strips, and lancets, as covered by her insurance.  For once daily testing., Disp: 1 each, Rfl: 0     blood glucose test strips, Use 1 each As Directed 2 (two) times a day before meals. Dispense brand per patient's insurance at pharmacy discretion., Disp: , Rfl: 3     generic lancets, Use 1 each As Directed 3 (three) times a day before meals. Dispense brand per patient's insurance at pharmacy discretion., Disp: , Rfl: 3     prenatal vit-iron fum-folic ac (PRENATAL VITAMIN) 27 mg iron- 0.8 mg Tab, Take 1 tablet by mouth once daily., Disp: 100 tablet, Rfl: 3    Social:  Social History   Substance Use Topics     Smoking status: Never Smoker     Smokeless tobacco: Never Used     Alcohol use No       OBJECTIVE:  No Known Allergies  Vitals:    10/18/18 0900   BP: 118/70   Pulse: 96     Body mass index is 38.22 kg/(m^2).    Vital signs stable as recorded above  General: Patient is alert and oriented x 3, in no apparent distress  Remainder of physical exam  deferred to patient's First OB Visit.    Results:  Results for orders placed or performed in visit on 10/18/18   Pregnancy, Urine   Result Value Ref Range    Pregnancy Test, Urine Positive (!) Negative   Glycosylated Hemoglobin A1c   Result Value Ref Range    Hemoglobin A1c 7.2 (H) 3.5 - 6.0 %   Urinalysis, OB Screen   Result Value Ref Range    Glucose, UA Negative Negative    Ketones, UA Negative Negative    Protein, UA Negative Negative mg/dL   HIV Antigen/Antibody Screening Cascade   Result Value Ref Range    HIV Antigen / Antibody Negative Negative   Lead, Blood   Result Value Ref Range    Lead  <5.0 ug/dL    Collection Method Venous     Lead Retest No    Drugs of Abuse 1,Urine   Result Value Ref Range    Amphetamines Screen Negative Screen Negative    Benzodiazepines Screen Negative Screen Negative    Opiates Screen Negative Screen Negative    Phencyclidine Screen Negative Screen Negative    THC Screen Negative Screen Negative    Barbiturates Screen Negative Screen Negative    Cocaine Metabolite Screen Negative Screen Negative    Oxycodone Screen Negative Screen Negative    Creatinine, Urine 326.0 mg/dL   HM1 (CBC with Diff)   Result Value Ref Range    WBC 5.8 4.0 - 11.0 thou/uL    RBC 4.35 3.80 - 5.40 mill/uL    Hemoglobin 12.5 12.0 - 16.0 g/dL    Hematocrit 40.2 35.0 - 47.0 %    MCV 92 80 - 100 fL    MCH 28.7 27.0 - 34.0 pg    MCHC 31.1 (L) 32.0 - 36.0 g/dL    RDW 12.6 11.0 - 14.5 %    Platelets 310 140 - 440 thou/uL    MPV 10.4 8.5 - 12.5 fL    Neutrophils % 47 (L) 50 - 70 %    Lymphocytes % 40 20 - 40 %    Monocytes % 12 (H) 2 - 10 %    Eosinophils % 1 0 - 6 %    Basophils % 1 0 - 2 %    Neutrophils Absolute 2.7 2.0 - 7.7 thou/uL    Lymphocytes Absolute 2.3 0.8 - 4.4 thou/uL    Monocytes Absolute 0.7 0.0 - 0.9 thou/uL    Eosinophils Absolute 0.1 0.0 - 0.4 thou/uL    Basophils Absolute 0.0 0.0 - 0.2 thou/uL     Other screening pregnancy lab work is ordered and pending.    Patient scored a 3/30 on [place]   Depression Screen.    Assessment and Plan:  1. Pregnancy Intake Appointment, High-Risk Pregnancy  Ava Garcia is 22 y.o. and .  Patient's last menstrual period was 2018 (exact date).  Estimated Date of Delivery: 19  She will be seeing an OBGYN doctor for OB care, due to high-risk pregnancy.  Screening pregnancy lab work was drawn.  Prenatal vitamins prescribed.  Problem list and current medications reviewed and updated.  Dating ultrasound ordered.  Prenatal info packet given.    2. Type 2 Diabetes.  Lab Results   Component Value Date    HGBA1C 7.2 (H) 10/18/2018   not taking any medicines right now.  Glucometer ordered for her and I asked her to start checking her sugars about once a day.  Referral made to Endocrine and transfer of OB care to OBGYN for high-risk pregnancy.    Follow up in 2-4 weeks.  Please see OB Episode for complete details.  Visit was approximately 30 minutes, greater than 50% of time spent in face-to-face counseling and coordination of care.    This dictation uses voice recognition software, which may contain typographical errors.

## 2021-06-24 NOTE — PROGRESS NOTES
"FOOT AND ANKLE SURGERY/PODIATRY Progress Note        ASSESSMENT:   Edema left foot    HPI: Ava Garcia was seen again today planing about some mild swelling involving her left foot.  The patient stated that she is in her second trimester pregnancy.  She complains of some mild swelling left foot.  She has no pain.  She is able to wear shoes without discomfort.  She has not noticed any redness or discoloration of the skin.  She has no joint pain.  He is able to wear shoes without discomfort.  She denies any trauma to her left foot.      History reviewed. No pertinent past medical history.    Past Surgical History:   Procedure Laterality Date     BUNIONECTOMY  2014, 2015    x2       No Known Allergies      Current Outpatient Medications:      acetone, urine, test (ACETONE, URINE, TEST) Strp, Use as directed by diabetes education, Disp: , Rfl:      ACETONE, URINE, TEST strip, , Disp: , Rfl:      BD ULTRA-FINE MINI PEN NEEDLE 31 gauge x 3/16\" Ndle, , Disp: , Rfl:      blood glucose meter (GLUCOMETER), Please provide patient with glucometer, test strips, and lancets, as covered by her insurance.  For once daily testing., Disp: 1 each, Rfl: 0     blood glucose test strips, Use 1 each As Directed 2 (two) times a day before meals. Dispense brand per patient's insurance at pharmacy discretion., Disp: 200 strip, Rfl: 3     cholecalciferol, vitamin D3, 50,000 unit Tab, Take 1 tablet by mouth once a week., Disp: 12 tablet, Rfl: 3     ECONTRA EZ tablet, , Disp: , Rfl:      folic acid (FOLVITE) 1 MG tablet, , Disp: , Rfl:      generic lancets, Use 1 each As Directed 3 (three) times a day before meals. Dispense brand per patient's insurance at pharmacy discretion., Disp: , Rfl: 3     insulin glargine (LANTUS SOLOSTAR U-100 INSULIN) 100 unit/mL (3 mL) pen, 10 units at bedtime to start with metformin, Disp: , Rfl:      iron bis-gly/FA/C/B12/Ca/succ (IRON-150 ORAL), Take 1 tablet by mouth., Disp: , Rfl:      LANTUS SOLOSTAR U-100 " "INSULIN 100 unit/mL (3 mL) pen, , Disp: , Rfl:      metFORMIN (GLUCOPHAGE-XR) 500 MG 24 hr tablet, , Disp: , Rfl:      metoclopramide (REGLAN) 10 MG tablet, Take 1 tablet (10 mg total) by mouth every 6 (six) hours., Disp: 30 tablet, Rfl: 0     pen needle, diabetic (PEN NEEDLE) 31 gauge x 3/16\" Ndle, Use 5 pen needles daily or as directed., Disp: , Rfl:      prenatal vit-iron fum-folic ac (PRENATAL VITAMIN) 27 mg iron- 0.8 mg Tab, Take 1 tablet by mouth once daily., Disp: 100 tablet, Rfl: 3     XULANE 150-35 mcg/24 hr, , Disp: , Rfl:     Family History   Problem Relation Age of Onset     Polycystic ovary syndrome Mother      Alcohol abuse Father      No Medical Problems Sister      No Medical Problems Brother        Social History     Socioeconomic History     Marital status: Single     Spouse name: Not on file     Number of children: Not on file     Years of education: Not on file     Highest education level: Not on file   Social Needs     Financial resource strain: Not on file     Food insecurity - worry: Not on file     Food insecurity - inability: Not on file     Transportation needs - medical: Not on file     Transportation needs - non-medical: Not on file   Occupational History     Occupation: Fine Jewelry Retailer     Employer: [employer]   Tobacco Use     Smoking status: Never Smoker     Smokeless tobacco: Never Used   Substance and Sexual Activity     Alcohol use: No     Drug use: No     Sexual activity: Not Currently     Partners: Male     Birth control/protection: Implant   Other Topics Concern     Not on file   Social History Narrative    Lives at home, attends [employer]. Wants to be pre-barbara nurse.       10 point Review of Systems is negative except for mild edema left foot.       Vitals:    19 1343   BP: 112/56       BMI= Body mass index is 41.5 kg/m .    OBJECTIVE:  General appearance: Patient is alert and fully cooperative with history & exam.  No sign of distress is noted during the " visit.  Vascular: Dorsalis pedis and posterior tibial pulses are palpable. There is pedal hair growth bilaterally.  CFT < 3 sec from anterior tibial surface to distal digits bilaterally. There is mild edema noted dorsal aspect left foot.  No pain on palpation left foot.   Dermatologic: Turgor and texture are within normal limits. No coloration or temperature changes. No primary or secondary lesions noted.  Neurologic: All epicritic and proprioceptive sensations are grossly intact bilaterally.  Musculoskeletal: All active and passive ankle, subtalar, midtarsal, and 1st MPJ range of motion are grossly intact without pain or crepitus, with the exception of none. Manual muscle strength is bilaterally. All dorsiflexors, plantarflexors, invertors, evertors are intact bilaterally.  No tenderness present to left foot on palpation.  No tenderness to left foot or ankle with range of motion. Calf is soft and non-tender without warmth or induration bilateral lower extremities.    Imaging:     No results found.         TREATMENT:  I recommended the patient had an elastic ankle brace for some compression therapy.  She was told this would reduce her edema.  She is to wear the brace as needed.  She is to return to the clinic if she has any other concerns.        Herbert Klein; IVAN  [facility] Foot & Ankle Surgery/Podiatry

## 2021-06-24 NOTE — PROGRESS NOTES
CHELSEYM received referral from [facility].  Patient is scheduled for fetal echocardiogram/L2/consult on 3/5/19 at [facility].    Stephanie Nielsen

## 2021-06-25 NOTE — PROGRESS NOTES
"Progress Notes by Fina Anguiano RD, LESLIE at 8/14/2017  3:00 PM     Author: Fina Anguiano RD, LESLIE Service: -- Author Type: Diabetes Ed    Filed: 8/14/2017  3:51 PM Encounter Date: 8/14/2017 Status: Attested    : Fina Anguiano RD, CHRISTIANO (Diabetes Ed) Cosigner: Scarlett Brock MD at 8/14/2017  4:34 PM    Attestation signed by Scarlett Brock MD at 8/14/2017  4:34 PM    agree                Assessment: Ava arrived today without her BG meter.  She has a BG meter ready at the pharmacy, however has not picked up due to cost of > $200.00.  Her Mother has a high deduct able plan.  Her Mother and two siblings are present today.  Discussed [employer] brand BG meter at [employer].  Pt will consider it.  Educated pt on A1c and BG goals.    She has been titrating Metformin dose as prescribed and is now taking 1000 mg bid without concerns.  Pt stated she struggles with portion sizes and will skip meals throughout the day and then eat large portions when home.  Provided Living well with Diabetes and placemat and demonstrated several examples of portion sizes of typical meals.  Discussed getting a 9\" plate.    She walks at work although stated she works in the [employer] at [email or web address] and she takes her siblings to the park 1x/week when the weather is nice.      Plan: see goals    Subjective and Objective:      Ava Garcia is referred by Dr Brock for Diabetes Education.     Lab Results   Component Value Date    HGBA1C 8.8 (H) 08/07/2017         Current diabetes medications:    Metformin 100 mg bid      Meals:  B- cereal with milk  L-noodles when working or ravioli or chicken with rice only 30 min lunch or turkey sand with chips  Or hot dog or turkey sand with cheese and acosta  D-rice with meat (chicken) with water  HS- cake or brownies or cookies or ice cream bars 3-4 days per week       Goals     ? activity            Walking to park once per week.      ? monitoring            Test each day rotating " test times      ? nutrition            Include three meals per day.  Watch portion sizes of meals.            Follow up:   Primary care visit      Education:     Monitoring   Meter (per above goals): Assessed, Discussed and Needs instruction/review at follow-up  Monitoring: Assessed, Discussed and Needs instruction/review at follow-up  BG goals: Assessed, Discussed and Needs instruction/review at follow-up    Nutrition Management  Nutrition Management: Assessed, Discussed and Literature provided  Weight: Assessed and Discussed  Portions/Balance: Assessed, Discussed and Literature provided  Carb ID/Count: Assessed, Discussed and Literature provided  Label Reading: Needs instruction/review at follow-up  Heart Healthy Fats: Needs instruction/review at follow-up  Menu Planning: Needs instruction/review at follow-up  Dining Out: Needs instruction/review at follow-up  Physical Activity: Assessed and Discussed  Medications: Assessed and Discussed  Orals: Assessed and Discussed  Injected Medications: Not addressed   Storage/Exp:Not addressed   Site Rotation: Not addressed   Sites Assessed: no    Diabetes Disease Process: Assessed    Acute Complications: Prevent, Detect, Treat:  Hypoglycemia: Assessed and Literature provided  Hyperglycemia: Assessed and Literature provided  Sick Days: Needs instruction/review at follow-up  Driving: Not addressed    Chronic Complications  Foot Care:Needs instruction/review at follow-up  Skin Care: Needs instruction/review at follow-up  Eye: Needs instruction/review at follow-up  ABC: Needs instruction/review at follow-up  Teeth:Needs instruction/review at follow-up  Goal Setting and Problem Solving: Needs instruction/review at follow-up  Barriers: Assessed-finances portion sizes  Psychosocial Adjustments: Assessed      Time spent with the patient: 60 minutes for diabetes education and counseling.   Previous Education: no  Visit Type:MNT  Hours Remaining: DSMT 2 and MNT 1      Fina EVANS  Annmarie VIRK, JOSÉ LUIS, CDE  8/14/2017

## 2021-06-29 NOTE — PROGRESS NOTES
Progress Notes by Celeste Bhandari RD at 8/25/2020  3:00 PM     Author: Celeste Bhandari RD Service: -- Author Type: Registered Dietitian    Filed: 8/25/2020  4:33 PM Encounter Date: 8/25/2020 Status: Attested    : Celeste Bhandari RD (Registered Dietitian) Cosigner: Anna Kim PA-C at 8/25/2020  4:48 PM    Attestation signed by Anna Kim PA-C at 8/25/2020  4:48 PM    Agree                Patient has given verbal consent to a Video visit? Yes    Patient would like the video invitation sent by:     Type of service:  Video Visit  Originating Location (pt. Location): Home  Distant Location (provider location):  [facility] [place] DIABETES EDUCATION   Mode of Communication:  Video Conference via EATON Start Time: 3:15  Video End Time (time video stopped): 3:45    Patient would like to receive their AVS by AVS Preference: [place].    Assessment: Ava participated in video visit for diabetes education.  She has had type 2 diabetes [facility] 2018.  currently taking 1000 mg metformin bid, this was recently increased.  She has year old baby boy, reports managing diabetes very carefully during pregnancy.   She stopped checking BG after his birth.  She does not have any BG testing supplies at this time.   She communicates her interest in improving diabetes management.  She has active job in liquor store, lifting boxes, etc.  Tries to walk in mall during break time.   She reports good appetite, choses heatlhy foods but portions are large.  She does like fruit juices and sugar tea.    Plan:  Reviewed managing type 2 diabetes:  BG goals, nutrition and activity guidelines.  Recommnend checking FBS and one other 2 hour post meal reading at varying times.   Discussed progressive nature of diabetes and she may be candidate for GLP-1 medication.      Goals:  check BG               eat grilled instead of fired meats (has air fryer)               log food for one week,  measure food portions.   Follow up scheduled for 2-3 weeks.          Subjective and Objective:      Ava Garcia is referred by Anna Kim for Diabetes Education.     Lab Results   Component Value Date    HGBA1C 11.3 (H) 08/17/2020     Component      Latest Ref Rng & Units 11/14/2019 8/17/2020   Hemoglobin A1c      <=5.6 % 13.2 (H) 11.3 (H)       Goals     ? activity      Walking to park once per week.      ? monitoring      Test each day rotating test times      ? nutrition      Include three meals per day.  Watch portion sizes of meals.            Follow up:   Diabetes classes for 3 weeks      Education:     Monitoring   Meter (per above goals): assessment, discussed, pt returned demonstration,  Monitoring: assessment, discussed, pt returned demonstration, literature provided   BG goals: assessment, discussed, pt returned demonstration, literature provided      Nutrition Management:  assessment, discussed, pt returned demo,  literature provided   Weight:assessment, discussed, pt returned demo,  literature provided   Portions/Balance: assessment, discussed, pt returned demo,  literature provided   Carb ID/Count: assessment, discussed, pt returned demo,  literature provided   Label Reading: assessment, discussed, pt returned demo,  literature provided   Heart Healthy Fats:assessment, discussed, pt returned demo,  literature provided   Menu Planning: assessment, discussed, pt returned demo,  literature provided   Dining Out: assessment, discussed,  literature provided   Physical Activity: assessment, discussed,  literature provided   Medications: assessment, discussed  Orals: assessment, discussed  Injected Medications: Discussed     Diabetes Disease Process: Discussed    Acute Complications: Prevent, Detect, Treat:  Hypoglycemia: Discussed  Hyperglycemia: Assessed  Sick Days: Discussed    Chronic Complications  Foot Care: literature provided  Skin Care: Literature provided  Eye: Literature provided  ABC:  Discussed and Literature provided  Teeth:Literature provided  Goal Setting and Problem Solving: Discussed  Barriers: Discussed  Psychosocial Adjustments: Discussed      Time spent with the patient: 30 minutes for diabetes education and counseling.   Previous Education: yes  Visit Type:ALEX Bhandari  8/25/2020

## 2021-07-03 NOTE — ADDENDUM NOTE
Addendum Note by Shiv Kim PA-C at 1/29/2020  9:44 AM     Author: Shiv Kim PA-C Service: -- Author Type: Physician Assistant    Filed: 1/29/2020  9:44 AM Encounter Date: 12/22/2019 Status: Signed    : Shiv Kim PA-C (Physician Assistant)    Addended by: SHIV KIM on: 1/29/2020 09:44 AM        Modules accepted: Orders

## 2021-07-14 PROBLEM — Z34.00 NORMAL PREGNANCY, FIRST: Status: RESOLVED | Noted: 2018-10-18 | Resolved: 2019-05-23

## 2021-08-15 ENCOUNTER — HEALTH MAINTENANCE LETTER (OUTPATIENT)
Age: 26
End: 2021-08-15

## 2021-09-07 ENCOUNTER — NURSE TRIAGE (OUTPATIENT)
Dept: NURSING | Facility: CLINIC | Age: 26
End: 2021-09-07

## 2021-09-07 ENCOUNTER — OFFICE VISIT (OUTPATIENT)
Dept: FAMILY MEDICINE | Facility: CLINIC | Age: 26
End: 2021-09-07
Payer: COMMERCIAL

## 2021-09-07 VITALS
TEMPERATURE: 98.2 F | HEART RATE: 88 BPM | RESPIRATION RATE: 18 BRPM | BODY MASS INDEX: 39.97 KG/M2 | DIASTOLIC BLOOD PRESSURE: 76 MMHG | WEIGHT: 259 LBS | SYSTOLIC BLOOD PRESSURE: 110 MMHG

## 2021-09-07 DIAGNOSIS — E55.9 VITAMIN D DEFICIENCY: ICD-10-CM

## 2021-09-07 DIAGNOSIS — Z12.4 PAP SMEAR FOR CERVICAL CANCER SCREENING: ICD-10-CM

## 2021-09-07 DIAGNOSIS — E11.65 TYPE 2 DIABETES MELLITUS WITH HYPERGLYCEMIA, WITH LONG-TERM CURRENT USE OF INSULIN (H): ICD-10-CM

## 2021-09-07 DIAGNOSIS — N94.9 VAGINAL DISCOMFORT: Primary | ICD-10-CM

## 2021-09-07 DIAGNOSIS — Z79.4 TYPE 2 DIABETES MELLITUS WITH HYPERGLYCEMIA, WITH LONG-TERM CURRENT USE OF INSULIN (H): ICD-10-CM

## 2021-09-07 PROBLEM — N39.0 URINARY TRACT INFECTION: Status: RESOLVED | Noted: 2019-11-18 | Resolved: 2021-09-07

## 2021-09-07 LAB
ANION GAP SERPL CALCULATED.3IONS-SCNC: 15 MMOL/L (ref 5–18)
BUN SERPL-MCNC: 9 MG/DL (ref 8–22)
CALCIUM SERPL-MCNC: 10.1 MG/DL (ref 8.5–10.5)
CHLORIDE BLD-SCNC: 98 MMOL/L (ref 98–107)
CLUE CELLS: ABNORMAL
CO2 SERPL-SCNC: 21 MMOL/L (ref 22–31)
CREAT SERPL-MCNC: 0.91 MG/DL (ref 0.6–1.1)
GFR SERPL CREATININE-BSD FRML MDRD: 89 ML/MIN/1.73M2
GLUCOSE BLD-MCNC: 451 MG/DL (ref 70–125)
HBA1C MFR BLD: 13.2 % (ref 0–5.6)
POTASSIUM BLD-SCNC: 4.2 MMOL/L (ref 3.5–5)
SODIUM SERPL-SCNC: 134 MMOL/L (ref 136–145)
TRICHOMONAS, WET PREP: ABNORMAL
WBC'S/HIGH POWER FIELD, WET PREP: ABNORMAL
YEAST, WET PREP: ABNORMAL

## 2021-09-07 PROCEDURE — 87591 N.GONORRHOEAE DNA AMP PROB: CPT | Performed by: PHYSICIAN ASSISTANT

## 2021-09-07 PROCEDURE — 87210 SMEAR WET MOUNT SALINE/INK: CPT | Performed by: PHYSICIAN ASSISTANT

## 2021-09-07 PROCEDURE — 87491 CHLMYD TRACH DNA AMP PROBE: CPT | Performed by: PHYSICIAN ASSISTANT

## 2021-09-07 PROCEDURE — 80048 BASIC METABOLIC PNL TOTAL CA: CPT | Performed by: PHYSICIAN ASSISTANT

## 2021-09-07 PROCEDURE — 82306 VITAMIN D 25 HYDROXY: CPT | Performed by: PHYSICIAN ASSISTANT

## 2021-09-07 PROCEDURE — 36415 COLL VENOUS BLD VENIPUNCTURE: CPT | Performed by: PHYSICIAN ASSISTANT

## 2021-09-07 PROCEDURE — 83036 HEMOGLOBIN GLYCOSYLATED A1C: CPT | Performed by: PHYSICIAN ASSISTANT

## 2021-09-07 PROCEDURE — G0123 SCREEN CERV/VAG THIN LAYER: HCPCS | Performed by: PHYSICIAN ASSISTANT

## 2021-09-07 PROCEDURE — 99214 OFFICE O/P EST MOD 30 MIN: CPT | Performed by: PHYSICIAN ASSISTANT

## 2021-09-07 RX ORDER — FLUCONAZOLE 150 MG/1
150 TABLET ORAL ONCE
Qty: 1 TABLET | Refills: 0 | Status: SHIPPED | OUTPATIENT
Start: 2021-09-07 | End: 2021-09-07

## 2021-09-07 NOTE — TELEPHONE ENCOUNTER
"Critical Lab result notification:    Anila from [facility] called at 5:03pm to report the following Lab:     Glucose 451  Drawn today, 9/7/2021 at 9:39am    Writer called pt at 5:13pm  Pt was not not fasting, ate cereal Cereal before lab draw. She no longer takes insulin and does not check her blood glucose levels. Verified that she takes her Metformin per orders. She states she is not experiencing signs of high blood glucose and \"feels fine\". She was not home so she was unable to re-check her blood glucose.      Writer spoke with Dr. Carolina Calloway at 5:29pm. Reviewed Metformin dosing and A1C. No new orders. Please forward this message to PCP.     Writer called patient back to relay on-call providers response at 5:33pm and no answer. Left message to call writer back.     Imani Ho RN on 9/7/2021 at 5:43 PM    Reason for Disposition    Lab or radiology calling with CRITICAL test results    Additional Information    Negative: Call about patient who is currently hospitalized    Negative: Physician call to PCP    Negative: Lab calling with strep throat test results and triager can call in prescription    Negative: Lab calling with urinalysis test results and triager can call in prescription    Negative: Medication questions    Protocols used: PCP CALL - NO TRIAGE-A-      "

## 2021-09-07 NOTE — TELEPHONE ENCOUNTER
Pt called back at 5:47pm. Writer informed Pt that there were no new orders and to call back if she has sign of high blood glucose or if she checks her blood glucose and it is still elevated or higher than today's reading in the office.     Imani Ho RN  [facility] Nurse Advisor 5:50 PM 9/7/2021   Double Island Pedicle Flap Text: The defect edges were debeveled with a #15 scalpel blade.  Given the location of the defect, shape of the defect and the proximity to free margins a double island pedicle advancement flap was deemed most appropriate.  Using a sterile surgical marker, an appropriate advancement flap was drawn incorporating the defect, outlining the appropriate donor tissue and placing the expected incisions within the relaxed skin tension lines where possible.    The area thus outlined was incised deep to adipose tissue with a #15 scalpel blade.  The skin margins were undermined to an appropriate distance in all directions around the primary defect and laterally outward around the island pedicle utilizing iris scissors.  There was minimal undermining beneath the pedicle flap.

## 2021-09-07 NOTE — PROGRESS NOTES
Subjective:      Ava Garcia is a 25 year old female with chief complaint of vaginal irritation.    Vaginal discharge for a few weeks.  Some mild irritation.  Has not used any OTC medicines.  Uncontrolled type 2 diabetes.  Taking 2 Metformin twice a day.  She has not been checking her sugars regularly.  Has nexplanon for contraception.    Patient Active Problem List   Diagnosis     Type 2 diabetes mellitus (H)     Vitamin D deficiency     Morbid obesity (H)     Nexplanon insertion (7/16/19)     Trichomonas infection        Current Outpatient Medications:      folic acid (FOLVITE) 1 MG tablet, , Disp: , Rfl:      insulin aspart (NOVOLOG FLEXPEN) 100 UNIT/ML pen, Take 5 units before each meal., Disp: 15 mL, Rfl: 3     insulin glargine (LANTUS SOLOSTAR PEN) 100 UNIT/ML pen, 20 units at bedtime to start with metformin.  Increase by 5 units every 3 days until fasting blood sugar is less than 95., Disp: 15 mL, Rfl: 11     KETOSTIX test strip, Use as directed by diabetes education, Disp: 1 each, Rfl: 11     metFORMIN (GLUCOPHAGE-XR) 500 MG 24 hr tablet, 1 tab daily x 1 week, then increase to 2 tabs  x 1 week, then increase to 2 tabs twice daily, Disp: 90 tablet, Rfl: 11      Objective:     Allergies:  Patient has no known allergies.    /76 (BP Location: Left arm, Patient Position: Sitting, Cuff Size: Adult Large)   Pulse 88   Temp 98.2  F (36.8  C) (Temporal)   Resp 18   Wt 117.5 kg (259 lb)   BMI 39.97 kg/m    Body mass index is 39.97 kg/m .    General: Alert and oriented x 3, in no apparent distress  Genitourinary: External genitalia is normal in appearance, vaginal walls are healthy, cervix is fairly well visualized and normal in appearance, minimal white discharge noted    Recent Results (from the past 24 hour(s))   Hemoglobin A1c    Collection Time: 09/07/21  9:39 AM   Result Value Ref Range    Hemoglobin A1C 13.2 (H) 0.0 - 5.6 %   Wet preparation    Collection Time: 09/07/21  9:43 AM    Specimen:  Vagina; Swab   Result Value Ref Range    Trichomonas Absent Absent    Yeast Absent Absent    Clue Cells Absent Absent    WBCs/high power field 1+ (A) None     Other labs pending.    Assessment and Plan:     1. Vaginal discomfort  I will follow up with results and treat as appropriate.  Certainly could still be yeast infection (despite negative wet prep), given her uncontrolled diabetes.    - Wet preparation  - Chlamydia trachomatis/Neisseria gonorrhoeae by PCR    2. Type 2 diabetes mellitus with hyperglycemia, with long-term current use of insulin (H)  Diabetes with hyperglycemia.  This was not the sole focus of our visit today.  Screening labs ordered and I will follow-up with results.  She states she is taking Metformin 2 pills twice a day.  She has insulin on her med list, but says she is not taking this.  A1c is the same as last time it was checked Jan 2021.   I put a diabetes education referral in for her in July.  I can't see in her chart if anyone called her to schedule this.  We will follow-up with this.  - Hemoglobin A1c  - Basic metabolic panel    3. Vitamin D deficiency  History of low vitamin D.  She is not taking vitamin D at this time.  We will follow-up with screening labs.  - Vitamin D Deficiency; Future  - Vitamin D Deficiency    4. Pap smear for cervical cancer screening  Pap RN to follow-up with results.  - Pap imaged thin layer screen reflex to HPV if ASCUS - recommend age 25 - 29      This dictation uses voice recognition software, which may contain typographical errors.

## 2021-09-08 ENCOUNTER — MYC MEDICAL ADVICE (OUTPATIENT)
Dept: FAMILY MEDICINE | Facility: CLINIC | Age: 26
End: 2021-09-08

## 2021-09-08 DIAGNOSIS — B37.31 YEAST INFECTION OF THE VAGINA: Primary | ICD-10-CM

## 2021-09-08 LAB
BKR LAB AP GYN ADEQUACY: NORMAL
BKR LAB AP GYN INTERPRETATION: NORMAL
BKR LAB AP GYN OTHER FINDINGS: NORMAL
BKR LAB AP HPV REFLEX: NORMAL
BKR LAB AP PREVIOUS ABNORMAL: NORMAL
C TRACH DNA SPEC QL PROBE+SIG AMP: NEGATIVE
DEPRECATED CALCIDIOL+CALCIFEROL SERPL-MC: 9 UG/L (ref 30–80)
N GONORRHOEA DNA SPEC QL NAA+PROBE: NEGATIVE
PATH REPORT.COMMENTS IMP SPEC: NORMAL
PATH REPORT.RELEVANT HX SPEC: NORMAL

## 2021-09-09 ENCOUNTER — TELEPHONE (OUTPATIENT)
Dept: FAMILY MEDICINE | Facility: CLINIC | Age: 26
End: 2021-09-09

## 2021-09-09 RX ORDER — FLUCONAZOLE 150 MG/1
150 TABLET ORAL ONCE
Qty: 1 TABLET | Refills: 0 | Status: SHIPPED | OUTPATIENT
Start: 2021-09-09 | End: 2021-09-09

## 2021-09-09 NOTE — TELEPHONE ENCOUNTER
Call pt: her diabetes is not under good control.  Having blood sugars this high puts her at risk for kidney damage, vision problems, and stroke.  We need to add additional medicines to lower her sugars.  We will have her meet with DM Ed to discuss what med options will work best for her.    --  I put in a referral for her to see DM Ed last month, can you see why appt wasn't scheduled?

## 2021-09-10 DIAGNOSIS — Z79.4 TYPE 2 DIABETES MELLITUS WITH HYPERGLYCEMIA, WITH LONG-TERM CURRENT USE OF INSULIN (H): ICD-10-CM

## 2021-09-10 DIAGNOSIS — E55.9 VITAMIN D DEFICIENCY: Primary | ICD-10-CM

## 2021-09-10 DIAGNOSIS — E11.65 TYPE 2 DIABETES MELLITUS WITH HYPERGLYCEMIA, WITH LONG-TERM CURRENT USE OF INSULIN (H): ICD-10-CM

## 2021-09-10 PROBLEM — A59.9 TRICHOMONAS INFECTION: Status: RESOLVED | Noted: 2019-08-20 | Resolved: 2021-09-10

## 2021-09-10 RX ORDER — ERGOCALCIFEROL 1.25 MG/1
50000 CAPSULE, LIQUID FILLED ORAL WEEKLY
Qty: 12 CAPSULE | Refills: 3 | Status: SHIPPED | OUTPATIENT
Start: 2021-09-10

## 2021-09-13 ENCOUNTER — TELEPHONE (OUTPATIENT)
Dept: FAMILY MEDICINE | Facility: CLINIC | Age: 26
End: 2021-09-13

## 2021-09-13 DIAGNOSIS — N76.0 BACTERIAL VAGINOSIS: Primary | ICD-10-CM

## 2021-09-13 DIAGNOSIS — B96.89 BACTERIAL VAGINOSIS: Primary | ICD-10-CM

## 2021-09-13 RX ORDER — METRONIDAZOLE 500 MG/1
500 TABLET ORAL 2 TIMES DAILY
Qty: 14 TABLET | Refills: 0 | Status: SHIPPED | OUTPATIENT
Start: 2021-09-13 | End: 2021-09-20

## 2021-09-13 NOTE — TELEPHONE ENCOUNTER
"----- Message from Rod Luna MD sent at 9/13/2021  4:41 PM CDT -----  Please contact this patient, let her know that the Pap smear showed focal bacterial vaginosis.Since she was having symptoms I think she should go on an antibiotic, metronidazole 500 mg 1 twice a day for 7 days.Do not drink alcohol when you take this.This was sent to her pharmacy.  ----- Message -----  From: Jeannette Kenyon, RN  Sent: 9/13/2021   3:47 PM CDT  To: MUKESH Gr,    25 year old female with current NIL pap, which has an incidental finding of \"Shift in vaginal dick suggestive of bacterial vaginosis\". This finding is generally not forwarded to providers, however, due to patient c/o vaginal discomfort at LOV, wanted to forward to you.    If you recommend anything further, please have clinic care team contact patient as pap team does not handle incidental findings.    Will send normal pap letter through [employer] with recommendation for repeat pap in 3 years.       Thanks!  KAMERON Holm, RN - Pap Tracking    "

## 2021-09-21 ENCOUNTER — TELEPHONE (OUTPATIENT)
Dept: EDUCATION SERVICES | Facility: CLINIC | Age: 26
End: 2021-09-21

## 2021-09-21 NOTE — TELEPHONE ENCOUNTER
Ava did not arrive for her Diabetes Ed appointment today. Our scheduling team will reach out to reschedule.     Christine Li

## 2021-10-11 ENCOUNTER — HEALTH MAINTENANCE LETTER (OUTPATIENT)
Age: 26
End: 2021-10-11

## 2022-01-16 ENCOUNTER — TELEPHONE (OUTPATIENT)
Dept: FAMILY MEDICINE | Facility: CLINIC | Age: 27
End: 2022-01-16

## 2022-01-19 NOTE — TELEPHONE ENCOUNTER
Left message #1 at [phone]. Postponing task out to a week and will try again. If patient returns call back, please help patient schedule an appointment per message below. Thanks!

## 2022-03-27 ENCOUNTER — HEALTH MAINTENANCE LETTER (OUTPATIENT)
Age: 27
End: 2022-03-27

## 2022-05-22 ENCOUNTER — HEALTH MAINTENANCE LETTER (OUTPATIENT)
Age: 27
End: 2022-05-22

## 2022-09-25 ENCOUNTER — HEALTH MAINTENANCE LETTER (OUTPATIENT)
Age: 27
End: 2022-09-25

## 2023-01-10 ENCOUNTER — TELEPHONE (OUTPATIENT)
Dept: INTERNAL MEDICINE | Facility: CLINIC | Age: 28
End: 2023-01-10

## 2023-01-10 NOTE — TELEPHONE ENCOUNTER
"----- Message from Tiago Gómez MD sent at 1/10/2023 10:47 AM CST -----  Patient put into my schedule tomorrow for \"STD\".  I do not treat vaginal infections or similar.  If she has some sort of vaginal issue she really needs to be scheduled with a midwife or women's care provider.    "

## 2023-01-10 NOTE — TELEPHONE ENCOUNTER
01/10 lm on voicemail cancelling appt with Dalia pt needs to follow up with female physician or midwife he does not do female l issues . Told to call and reschedule

## 2023-01-11 ENCOUNTER — OFFICE VISIT (OUTPATIENT)
Dept: INTERNAL MEDICINE | Facility: CLINIC | Age: 28
End: 2023-01-11
Payer: COMMERCIAL

## 2023-01-11 VITALS
HEART RATE: 87 BPM | WEIGHT: 252.4 LBS | OXYGEN SATURATION: 99 % | TEMPERATURE: 98.4 F | HEIGHT: 68 IN | SYSTOLIC BLOOD PRESSURE: 129 MMHG | BODY MASS INDEX: 38.25 KG/M2 | DIASTOLIC BLOOD PRESSURE: 76 MMHG

## 2023-01-11 DIAGNOSIS — N76.0 BACTERIAL VAGINOSIS: ICD-10-CM

## 2023-01-11 DIAGNOSIS — B96.89 BACTERIAL VAGINOSIS: ICD-10-CM

## 2023-01-11 DIAGNOSIS — Z11.3 ROUTINE SCREENING FOR STI (SEXUALLY TRANSMITTED INFECTION): Primary | ICD-10-CM

## 2023-01-11 DIAGNOSIS — E11.65 TYPE 2 DIABETES MELLITUS WITH HYPERGLYCEMIA, WITHOUT LONG-TERM CURRENT USE OF INSULIN (H): ICD-10-CM

## 2023-01-11 DIAGNOSIS — B37.31 CANDIDIASIS OF VAGINA: ICD-10-CM

## 2023-01-11 DIAGNOSIS — E56.9 VITAMIN DEFICIENCY: ICD-10-CM

## 2023-01-11 LAB
CLUE CELLS: PRESENT
HBA1C MFR BLD: 13.4 % (ref 0–5.6)
TRICHOMONAS, WET PREP: ABNORMAL
WBC'S/HIGH POWER FIELD, WET PREP: ABNORMAL
YEAST, WET PREP: PRESENT

## 2023-01-11 PROCEDURE — 99214 OFFICE O/P EST MOD 30 MIN: CPT | Performed by: NURSE PRACTITIONER

## 2023-01-11 PROCEDURE — 86803 HEPATITIS C AB TEST: CPT | Performed by: NURSE PRACTITIONER

## 2023-01-11 PROCEDURE — 87491 CHLMYD TRACH DNA AMP PROBE: CPT | Performed by: NURSE PRACTITIONER

## 2023-01-11 PROCEDURE — 86704 HEP B CORE ANTIBODY TOTAL: CPT | Performed by: NURSE PRACTITIONER

## 2023-01-11 PROCEDURE — 87591 N.GONORRHOEAE DNA AMP PROB: CPT | Performed by: NURSE PRACTITIONER

## 2023-01-11 PROCEDURE — 36415 COLL VENOUS BLD VENIPUNCTURE: CPT | Performed by: NURSE PRACTITIONER

## 2023-01-11 PROCEDURE — 87389 HIV-1 AG W/HIV-1&-2 AB AG IA: CPT | Performed by: NURSE PRACTITIONER

## 2023-01-11 PROCEDURE — 83036 HEMOGLOBIN GLYCOSYLATED A1C: CPT | Performed by: NURSE PRACTITIONER

## 2023-01-11 PROCEDURE — 87210 SMEAR WET MOUNT SALINE/INK: CPT | Performed by: NURSE PRACTITIONER

## 2023-01-11 PROCEDURE — 86706 HEP B SURFACE ANTIBODY: CPT | Performed by: NURSE PRACTITIONER

## 2023-01-11 PROCEDURE — 87340 HEPATITIS B SURFACE AG IA: CPT | Performed by: NURSE PRACTITIONER

## 2023-01-11 RX ORDER — METRONIDAZOLE 500 MG/1
500 TABLET ORAL 2 TIMES DAILY
Qty: 14 TABLET | Refills: 0 | Status: SHIPPED | OUTPATIENT
Start: 2023-01-11 | End: 2023-01-18

## 2023-01-11 RX ORDER — METFORMIN HCL 500 MG
TABLET, EXTENDED RELEASE 24 HR ORAL
Qty: 90 TABLET | Refills: 0 | Status: SHIPPED | OUTPATIENT
Start: 2023-01-11 | End: 2023-09-18

## 2023-01-11 RX ORDER — INSULIN GLARGINE 100 [IU]/ML
20 INJECTION, SOLUTION SUBCUTANEOUS AT BEDTIME
Qty: 15 ML | Refills: 0 | Status: SHIPPED | OUTPATIENT
Start: 2023-01-11 | End: 2023-10-30

## 2023-01-11 RX ORDER — FLUCONAZOLE 150 MG/1
150 TABLET ORAL ONCE
Qty: 1 TABLET | Refills: 0 | Status: SHIPPED | OUTPATIENT
Start: 2023-01-11 | End: 2023-01-11

## 2023-01-11 ASSESSMENT — PAIN SCALES - GENERAL: PAINLEVEL: NO PAIN (0)

## 2023-01-11 NOTE — PROGRESS NOTES
"  Assessment & Plan   Problem List Items Addressed This Visit        Endocrine    Type 2 diabetes mellitus (H)    Relevant Medications    insulin glargine (LANTUS SOLOSTAR) 100 UNIT/ML pen    metFORMIN (GLUCOPHAGE XR) 500 MG 24 hr tablet    Other Relevant Orders    Hemoglobin A1c (Completed)   Other Visit Diagnoses     Routine screening for STI (sexually transmitted infection)    -  Primary    Relevant Orders    HIV Antigen Antibody Combo    Hepatitis B Surface Antibody    Hepatitis C antibody    Hepatitis B surface antigen    Hepatitis B core antibody    Neisseria gonorrhoeae PCR - Clinic Collect    Chlamydia trachomatis PCR - Clinic Collect    Wet prep - lab collect (Completed)    Bacterial vaginosis        Relevant Medications    metroNIDAZOLE (FLAGYL) 500 MG tablet    Candidiasis of vagina        Relevant Medications    fluconazole (DIFLUCAN) 150 MG tablet    Vitamin deficiency        Relevant Medications    insulin glargine (LANTUS SOLOSTAR) 100 UNIT/ML pen    metFORMIN (GLUCOPHAGE XR) 500 MG 24 hr tablet         - STI screening as ordered. No known exposures to an STI and no change in partner. Yeast infection is a possibility with untreated diabetes. Alternatively, she may have pelvic irritation related to uncontrolled diabetes.   - Encouraged follow up with her PCP to address diabetes.Will assist her with scheduling this visit today and A1C will be drawn along with STI screening lab work  - She declines vaccines including influenza, covid   - Resume Lantus and metformin      BMI:   Estimated body mass index is 38.95 kg/m  as calculated from the following:    Height as of this encounter: 1.715 m (5' 7.5\").    Weight as of this encounter: 114.5 kg (252 lb 6.4 oz).       Return in about 4 weeks (around 2/8/2023) for Follow up.    Christy Cuevas NP  [facility] JORDONHOMERO Escalante is a 27 year old, presenting for the following health issues:  STD (Check)    Pt is here for a STD check. She " "has had vaginal itching and irritation as well as a clear/white discharge.  She does not have a new partner but she is also not certain of fidelity so she wants to be checked for STI.     DM- no recent glucose checks. Historically diabetes has been uncontrolled. She has not been using any of the insulins recently and no recent glucose checks.     History of Present Illness       Reason for visit:  Std  Symptom onset:  3-7 days ago    She eats 0-1 servings of fruits and vegetables daily.She consumes 1 sweetened beverage(s) daily.She exercises with enough effort to increase her heart rate 10 to 19 minutes per day.  She exercises with enough effort to increase her heart rate 3 or less days per week.   She is taking medications regularly.           Objective    /76 (BP Location: Right arm, Patient Position: Sitting, Cuff Size: Adult Regular)   Pulse 87   Temp 98.4  F (36.9  C) (Oral)   Ht 1.715 m (5' 7.5\")   Wt 114.5 kg (252 lb 6.4 oz)   LMP 12/23/2022   SpO2 99%   Breastfeeding No   BMI 38.95 kg/m    Body mass index is 38.95 kg/m .  Physical Exam   GENERAL: Alert and no distress                "

## 2023-01-11 NOTE — ADDENDUM NOTE
Addended by: CAMERON YODER on: 1/11/2023 04:30 PM     Modules accepted: Orders, Level of Service

## 2023-01-12 LAB
C TRACH DNA SPEC QL NAA+PROBE: NEGATIVE
HBV CORE AB SERPL QL IA: NONREACTIVE
HBV SURFACE AB SERPL IA-ACNC: 2.21 M[IU]/ML
HBV SURFACE AB SERPL IA-ACNC: NONREACTIVE M[IU]/ML
HBV SURFACE AG SERPL QL IA: NONREACTIVE
HCV AB SERPL QL IA: NONREACTIVE
HIV 1+2 AB+HIV1 P24 AG SERPL QL IA: NONREACTIVE
N GONORRHOEA DNA SPEC QL NAA+PROBE: NEGATIVE

## 2023-06-01 ENCOUNTER — TRANSFERRED RECORDS (OUTPATIENT)
Dept: MULTI SPECIALTY CLINIC | Facility: CLINIC | Age: 28
End: 2023-06-01

## 2023-06-01 LAB — RETINOPATHY: NORMAL

## 2023-06-04 ENCOUNTER — HEALTH MAINTENANCE LETTER (OUTPATIENT)
Age: 28
End: 2023-06-04

## 2023-08-05 ENCOUNTER — HEALTH MAINTENANCE LETTER (OUTPATIENT)
Age: 28
End: 2023-08-05

## 2023-09-01 ENCOUNTER — OFFICE VISIT (OUTPATIENT)
Dept: FAMILY MEDICINE | Facility: CLINIC | Age: 28
End: 2023-09-01
Payer: COMMERCIAL

## 2023-09-01 VITALS
BODY MASS INDEX: 37.98 KG/M2 | WEIGHT: 242 LBS | SYSTOLIC BLOOD PRESSURE: 120 MMHG | HEART RATE: 80 BPM | TEMPERATURE: 98.7 F | RESPIRATION RATE: 16 BRPM | OXYGEN SATURATION: 98 % | HEIGHT: 67 IN | DIASTOLIC BLOOD PRESSURE: 74 MMHG

## 2023-09-01 DIAGNOSIS — Z11.3 SCREEN FOR STD (SEXUALLY TRANSMITTED DISEASE): Primary | ICD-10-CM

## 2023-09-01 DIAGNOSIS — E11.9 TYPE 2 DIABETES MELLITUS WITHOUT COMPLICATION, WITH LONG-TERM CURRENT USE OF INSULIN (H): ICD-10-CM

## 2023-09-01 DIAGNOSIS — Z79.4 TYPE 2 DIABETES MELLITUS WITHOUT COMPLICATION, WITH LONG-TERM CURRENT USE OF INSULIN (H): ICD-10-CM

## 2023-09-01 DIAGNOSIS — N76.0 BACTERIAL VAGINOSIS: ICD-10-CM

## 2023-09-01 DIAGNOSIS — B96.89 BACTERIAL VAGINOSIS: ICD-10-CM

## 2023-09-01 DIAGNOSIS — A59.9 TRICHOMONAS INFECTION: ICD-10-CM

## 2023-09-01 LAB
ANION GAP SERPL CALCULATED.3IONS-SCNC: 12 MMOL/L (ref 7–15)
BUN SERPL-MCNC: 8.7 MG/DL (ref 6–20)
CALCIUM SERPL-MCNC: 10 MG/DL (ref 8.6–10)
CHLORIDE SERPL-SCNC: 99 MMOL/L (ref 98–107)
CLUE CELLS: PRESENT
CREAT SERPL-MCNC: 0.55 MG/DL (ref 0.51–0.95)
CREAT UR-MCNC: 52.3 MG/DL
DEPRECATED HCO3 PLAS-SCNC: 26 MMOL/L (ref 22–29)
GFR SERPL CREATININE-BSD FRML MDRD: >90 ML/MIN/1.73M2
GLUCOSE SERPL-MCNC: 314 MG/DL (ref 70–99)
HBA1C MFR BLD: 14 % (ref 0–5.6)
MICROALBUMIN UR-MCNC: <12 MG/L
MICROALBUMIN/CREAT UR: NORMAL MG/G{CREAT}
POTASSIUM SERPL-SCNC: 4.2 MMOL/L (ref 3.4–5.3)
SODIUM SERPL-SCNC: 137 MMOL/L (ref 136–145)
TRICHOMONAS, WET PREP: PRESENT
WBC'S/HIGH POWER FIELD, WET PREP: ABNORMAL
YEAST, WET PREP: ABNORMAL

## 2023-09-01 PROCEDURE — 83036 HEMOGLOBIN GLYCOSYLATED A1C: CPT | Performed by: PHYSICIAN ASSISTANT

## 2023-09-01 PROCEDURE — 86803 HEPATITIS C AB TEST: CPT | Performed by: PHYSICIAN ASSISTANT

## 2023-09-01 PROCEDURE — 82043 UR ALBUMIN QUANTITATIVE: CPT | Performed by: PHYSICIAN ASSISTANT

## 2023-09-01 PROCEDURE — 82570 ASSAY OF URINE CREATININE: CPT | Performed by: PHYSICIAN ASSISTANT

## 2023-09-01 PROCEDURE — 87491 CHLMYD TRACH DNA AMP PROBE: CPT | Performed by: PHYSICIAN ASSISTANT

## 2023-09-01 PROCEDURE — 80048 BASIC METABOLIC PNL TOTAL CA: CPT | Performed by: PHYSICIAN ASSISTANT

## 2023-09-01 PROCEDURE — 87389 HIV-1 AG W/HIV-1&-2 AB AG IA: CPT | Performed by: PHYSICIAN ASSISTANT

## 2023-09-01 PROCEDURE — 87591 N.GONORRHOEAE DNA AMP PROB: CPT | Performed by: PHYSICIAN ASSISTANT

## 2023-09-01 PROCEDURE — 36415 COLL VENOUS BLD VENIPUNCTURE: CPT | Performed by: PHYSICIAN ASSISTANT

## 2023-09-01 PROCEDURE — 86780 TREPONEMA PALLIDUM: CPT | Performed by: PHYSICIAN ASSISTANT

## 2023-09-01 PROCEDURE — 87210 SMEAR WET MOUNT SALINE/INK: CPT | Performed by: PHYSICIAN ASSISTANT

## 2023-09-01 PROCEDURE — 99213 OFFICE O/P EST LOW 20 MIN: CPT | Performed by: PHYSICIAN ASSISTANT

## 2023-09-01 RX ORDER — METRONIDAZOLE 500 MG/1
2000 TABLET ORAL ONCE
Qty: 4 TABLET | Refills: 0 | Status: SHIPPED | OUTPATIENT
Start: 2023-09-01 | End: 2023-09-01

## 2023-09-01 NOTE — PROGRESS NOTES
"  Subjective:      Ava Garcia is a 27 year old female with chief complaint of vaginal discharge.  Having discharge and irritation for a while.  White discharge.  No dysuria.      Patient Active Problem List   Diagnosis    Type 2 diabetes mellitus (H)    Vitamin D deficiency    Morbid obesity (H)    Nexplanon insertion (7/16/19)        Current Outpatient Medications:     folic acid (FOLVITE) 1 MG tablet, , Disp: , Rfl:     insulin aspart (NOVOLOG FLEXPEN) 100 UNIT/ML pen, Take 5 units before each meal. (Patient not taking: Reported on 1/11/2023), Disp: 15 mL, Rfl: 3    insulin glargine (LANTUS SOLOSTAR) 100 UNIT/ML pen, Inject 20 Units Subcutaneous At Bedtime 20 units at bedtime to start with metformin (Patient not taking: Reported on 9/1/2023), Disp: 15 mL, Rfl: 0    KETOSTIX test strip, Use as directed by diabetes education (Patient not taking: Reported on 1/11/2023), Disp: 1 each, Rfl: 11    metFORMIN (GLUCOPHAGE XR) 500 MG 24 hr tablet, 1 tab daily x 1 week, then increase to 2 tabs  x 1 week, then increase to 2 tabs twice daily (Patient not taking: Reported on 9/1/2023), Disp: 90 tablet, Rfl: 0    vitamin D2 (ERGOCALCIFEROL) 18507 units (1250 mcg) capsule, Take 1 capsule (50,000 Units) by mouth once a week (Patient not taking: Reported on 9/1/2023), Disp: 12 capsule, Rfl: 3      Objective:     Allergies:  Patient has no known allergies.    /74 (BP Location: Right arm, Patient Position: Sitting, Cuff Size: Adult Large)   Pulse 80   Temp 98.7  F (37.1  C) (Temporal)   Resp 16   Ht 1.7 m (5' 6.93\")   Wt 109.8 kg (242 lb)   SpO2 98%   BMI 37.98 kg/m    Body mass index is 37.98 kg/m .    General: Alert and oriented x 3, in no apparent distress  Genitourinary: Exam deferred    Today's labs pending.  Self-collect wet prep swab completed.      Assessment and Plan:     1. Screen for STD (sexually transmitted disease)  Patient will be infomred of results when available.  - Wet preparation  - Chlamydia " trachomatis/Neisseria gonorrhoeae by PCR  - HIV Antigen Antibody Combo; Future  - Treponema Abs w Reflex to RPR and Titer; Future  - Hepatitis C antibody; Future  - HIV Antigen Antibody Combo  - Treponema Abs w Reflex to RPR and Titer  - Hepatitis C antibody    2. Trichomonas infection  Called patient an left message.  Detailed message sent through [employer].  Prescription sent.  Recommeded partners get treated.  Recommend re-test in future.  - metroNIDAZOLE (FLAGYL) 500 MG tablet; Take 4 tablets (2,000 mg) by mouth once for 1 dose  Dispense: 4 tablet; Refill: 0    3. Bacterial vaginosis  Called and left meassage.  Detailed discussion sent through [employer].  Prescription sent for dosing for trichomonas.  Can re-check to see if BV is gone at follow-up visit.  - metroNIDAZOLE (FLAGYL) 500 MG tablet; Take 4 tablets (2,000 mg) by mouth once for 1 dose  Dispense: 4 tablet; Refill: 0    4. Type 2 diabetes mellitus without complication, with long-term current use of insulin (H)  Chronic, uncontrolled.  This was not the primary focus of our visit today.  Has not been taking her diabetes medicines.  Labs drawn, and visit schedule in a few weeks to discuss further.  - Basic metabolic panel  - Albumin Random Urine Quantitative with Creat Ratio  - Hemoglobin A1c    5. Contraception Management  Nexplanon inserted 19,  22.  I did not have time to address this today.  Discussed with patient that Nexplanon is , and not fully effective to prevent pregnancy.  Appointment scheduled before she left to replace Nexplanon in future.  I discussed using condoms as back-up birth control until replacement.    This dictation uses voice recognition software, which may contain typographical errors.

## 2023-09-02 LAB
C TRACH DNA SPEC QL PROBE+SIG AMP: NEGATIVE
HCV AB SERPL QL IA: NONREACTIVE
HIV 1+2 AB+HIV1 P24 AG SERPL QL IA: NONREACTIVE
N GONORRHOEA DNA SPEC QL NAA+PROBE: NEGATIVE
T PALLIDUM AB SER QL: NONREACTIVE

## 2023-09-18 ENCOUNTER — OFFICE VISIT (OUTPATIENT)
Dept: FAMILY MEDICINE | Facility: CLINIC | Age: 28
End: 2023-09-18
Payer: COMMERCIAL

## 2023-09-18 VITALS
TEMPERATURE: 98.1 F | HEIGHT: 66 IN | BODY MASS INDEX: 38.89 KG/M2 | DIASTOLIC BLOOD PRESSURE: 76 MMHG | SYSTOLIC BLOOD PRESSURE: 123 MMHG | RESPIRATION RATE: 16 BRPM | WEIGHT: 242 LBS | OXYGEN SATURATION: 100 % | HEART RATE: 85 BPM

## 2023-09-18 DIAGNOSIS — Z30.46 SURVEILLANCE OF PREVIOUSLY PRESCRIBED IMPLANTABLE SUBDERMAL CONTRACEPTIVE: ICD-10-CM

## 2023-09-18 DIAGNOSIS — Z79.4 TYPE 2 DIABETES MELLITUS WITH HYPERGLYCEMIA, WITH LONG-TERM CURRENT USE OF INSULIN (H): ICD-10-CM

## 2023-09-18 DIAGNOSIS — A59.9 TRICHOMONAS INFECTION: ICD-10-CM

## 2023-09-18 DIAGNOSIS — E11.65 TYPE 2 DIABETES MELLITUS WITH HYPERGLYCEMIA, WITH LONG-TERM CURRENT USE OF INSULIN (H): ICD-10-CM

## 2023-09-18 DIAGNOSIS — Z30.46 NEXPLANON REMOVAL: ICD-10-CM

## 2023-09-18 DIAGNOSIS — E56.9 VITAMIN DEFICIENCY: ICD-10-CM

## 2023-09-18 DIAGNOSIS — Z30.017 NEXPLANON INSERTION: Primary | ICD-10-CM

## 2023-09-18 DIAGNOSIS — Z86.19 HISTORY OF TRICHOMONIASIS: ICD-10-CM

## 2023-09-18 DIAGNOSIS — E11.65 TYPE 2 DIABETES MELLITUS WITH HYPERGLYCEMIA, WITHOUT LONG-TERM CURRENT USE OF INSULIN (H): ICD-10-CM

## 2023-09-18 DIAGNOSIS — Z11.3 SCREEN FOR STD (SEXUALLY TRANSMITTED DISEASE): ICD-10-CM

## 2023-09-18 LAB
CLUE CELLS: ABNORMAL
HCG UR QL: NEGATIVE
TRICHOMONAS, WET PREP: PRESENT
WBC'S/HIGH POWER FIELD, WET PREP: ABNORMAL
YEAST, WET PREP: ABNORMAL

## 2023-09-18 PROCEDURE — 87210 SMEAR WET MOUNT SALINE/INK: CPT | Performed by: PHYSICIAN ASSISTANT

## 2023-09-18 PROCEDURE — 81025 URINE PREGNANCY TEST: CPT | Performed by: PHYSICIAN ASSISTANT

## 2023-09-18 PROCEDURE — 99214 OFFICE O/P EST MOD 30 MIN: CPT | Mod: 25 | Performed by: PHYSICIAN ASSISTANT

## 2023-09-18 PROCEDURE — 11983 REMOVE/INSERT DRUG IMPLANT: CPT | Performed by: PHYSICIAN ASSISTANT

## 2023-09-18 RX ORDER — METRONIDAZOLE 500 MG/1
500 TABLET ORAL 2 TIMES DAILY
Qty: 14 TABLET | Refills: 0 | Status: SHIPPED | OUTPATIENT
Start: 2023-09-18 | End: 2023-09-25

## 2023-09-18 RX ORDER — LIDOCAINE HYDROCHLORIDE AND EPINEPHRINE 10; 10 MG/ML; UG/ML
2.5 INJECTION, SOLUTION INFILTRATION; PERINEURAL ONCE
Status: COMPLETED | OUTPATIENT
Start: 2023-09-18 | End: 2023-09-18

## 2023-09-18 RX ORDER — METFORMIN HCL 500 MG
1000 TABLET, EXTENDED RELEASE 24 HR ORAL 2 TIMES DAILY WITH MEALS
Qty: 360 TABLET | Refills: 3 | Status: SHIPPED | OUTPATIENT
Start: 2023-09-18

## 2023-09-18 RX ADMIN — LIDOCAINE HYDROCHLORIDE AND EPINEPHRINE 2.5 ML: 10; 10 INJECTION, SOLUTION INFILTRATION; PERINEURAL at 17:47

## 2023-09-18 NOTE — PROGRESS NOTES
Subjective:      Ava Garcia is a 27 year old female with chief complaint of several concerns:    1.  Nexplanon replacement  Current Nexplanon  1 year ago.  She is here to have that replaced.  She denies any unprotected sex in the past 2 weeks.    2.  Type 2 diabetes  When she was here several weeks ago for STD screening, we did her diabetes labs.  A1c is out of control at 14.0%.  We have scheduled this visit today to both replace her Nexplanon and review her diabetes.  In the past she had taken metformin, Lantus, and mealtime insulin.  She says she has not taken any of these medicines for a long time.  Just has not been able to focus on them and keep up with them.  She does not note any unwanted side effects from medications when she was taking them.  In the past we had referred her for diabetic education as well, but she no showed or canceled those appointments.    3.  Trichomonas test for cure  Tested positive for trichomonas and BV at her last visit on 2023.  She was prescribed metronidazole 1 dose.  She followed directions and took medication appropriately.  She is here to test for cure.    Declines flu shot.      Patient Active Problem List   Diagnosis    Type 2 diabetes mellitus (H)    Vitamin D deficiency    Morbid obesity (H)    Nexplanon insertion (19)       Current Outpatient Medications:     blood glucose (NO BRAND SPECIFIED) lancets standard, Use to test blood sugar 1 time daily or as directed., Disp: 100 each, Rfl: 3    blood glucose (NO BRAND SPECIFIED) test strip, Use to test blood sugar 1 time daily or as directed., Disp: 100 strip, Rfl: 3    blood glucose monitoring (NO BRAND SPECIFIED) meter device kit, Use to test blood sugar 1 times daily or as directed., Disp: 1 kit, Rfl: 0    etonogestrel (NEXPLANON) 68 MG IMPL, 1 each (68 mg) by Subdermal route once, Disp: , Rfl:     metFORMIN (GLUCOPHAGE XR) 500 MG 24 hr tablet, Take 2 tablets (1,000 mg) by mouth 2 times daily (with  "meals) 1 tab daily x 1 week, then increase to 2 tabs  x 1 week, then increase to 2 tabs twice daily, Disp: 360 tablet, Rfl: 3    metroNIDAZOLE (FLAGYL) 500 MG tablet, Take 1 tablet (500 mg) by mouth 2 times daily for 7 days, Disp: 14 tablet, Rfl: 0    folic acid (FOLVITE) 1 MG tablet, , Disp: , Rfl:     insulin aspart (NOVOLOG FLEXPEN) 100 UNIT/ML pen, Take 5 units before each meal. (Patient not taking: Reported on 1/11/2023), Disp: 15 mL, Rfl: 3    insulin glargine (LANTUS SOLOSTAR) 100 UNIT/ML pen, Inject 20 Units Subcutaneous At Bedtime 20 units at bedtime to start with metformin (Patient not taking: Reported on 9/1/2023), Disp: 15 mL, Rfl: 0    vitamin D2 (ERGOCALCIFEROL) 63087 units (1250 mcg) capsule, Take 1 capsule (50,000 Units) by mouth once a week (Patient not taking: Reported on 9/1/2023), Disp: 12 capsule, Rfl: 3    Current Facility-Administered Medications:     etonogestrel (NEXPLANON) subdermal implant 68 mg, 1 each, Subdermal, Once, Anna Kim PA-C    etonogestrel (NEXPLANON) subdermal implant 68 mg, 1 each, Subdermal, Once, Anna Kim PA-C    lidocaine 1% with EPINEPHrine 1:100,000 injection 2.5 mL, 2.5 mL, Intradermal, Once, Anna Kim PA-C     Objective:     Allergies:  Patient has no known allergies.    Vitals:  /76 (BP Location: Left arm, Patient Position: Sitting, Cuff Size: Adult Large)   Pulse 85   Temp 98.1  F (36.7  C) (Temporal)   Resp 16   Ht 1.676 m (5' 6\")   Wt 109.8 kg (242 lb)   LMP 09/04/2023   SpO2 100%   BMI 39.06 kg/m    Body mass index is 39.06 kg/m .    Vital signs reviewed.  General: Patient is alert and oriented x 3, in no apparent distress      Procedure:  Left upper inner arm was adequately anesthetized with 2.5 cc of lidocaine with Epi.  Then, using sterile technique, 5 mm incision was made with an 11 blade.  Nexplanon was palpated subcutaneously and removed with a hemostat clamp.  Then, using sterile technique, new Nexplanon " was inserted and steve was deployed without difficulty.  New Nexplanon steve was palpable subcutaneously by myself.  Incision site was closed with steri-strips and wrapped with a pressure bandage.  Patient was neurovascularly intact after exam.  Appropriate wound aftercare was dicussed with patient.      Results for orders placed or performed in visit on 09/18/23   HCG qualitative urine     Status: Normal   Result Value Ref Range    hCG Urine Qualitative Negative Negative   Wet preparation     Status: Abnormal    Specimen: Vagina; Swab   Result Value Ref Range    Trichomonas Present (A) Absent    Yeast Absent Absent    Clue Cells Absent Absent    WBCs/high power field 4+ (A) None       Assessment and Plan:   1. Old Nexplanon removal  Old Nexplanon removed today.  - etonogestrel (NEXPLANON) subdermal implant 68 mg  - lidocaine 1% with EPINEPHrine 1:100,000 injection 2.5 mL  - etonogestrel (NEXPLANON) subdermal implant 68 mg  - REMOVAL AND REINSERTION NEXPLANON  - HCG qualitative urine    2. New Nexplanon insertion  Insertion Date: 09/18/23  3 Year Expiration Date: 09/18/26  Consent form was reviewed with patient, signed, and will be scanned in to her chart.  She knows to use back-up birth control for the next 1 week.   - etonogestrel (NEXPLANON) subdermal implant 68 mg  - lidocaine 1% with EPINEPHrine 1:100,000 injection 2.5 mL  - etonogestrel (NEXPLANON) subdermal implant 68 mg  - REMOVAL AND REINSERTION NEXPLANON  - HCG qualitative urine    3. Type 2 diabetes mellitus with hyperglycemia, with long-term current use of insulin (H)  Chronic, uncontrolled.  With hyperglycemia, blood sugar 314 on 9/1/2023.  In the past, she was taking metformin twice a day,  Mealtime insulin, and 20 units of Lantus daily.  She has not taken any of her medicines in a long time.  Was not having any side effects from medications.  We reviewed the importance of taking medications daily, and the health risks that uncontrolled diabetes can  cause.  She understands this, and is willing to restart metformin and Lantus and then go from there.  I will contact diabetic educator about starting dose of Lantus.  Patient would prefer finger poke blood sugar testing.  Supplies and meter ordered.  Consider referral to pharmacy or diabetic educator in the future.  Follow-up at her physical in 1 month.  A1c: 14.0% on 9/1/2023  Eye Exam: Recommended, follow-up at physical in 1 month  Foot Exam: Not done today, will get it physical in 1 month  Smoking: No  On a statin: No, due to age  Blood Pressure: Well controlled  Microalbumin: Up-to-date  On ACE inhibitor: No, due to age  - metFORMIN (GLUCOPHAGE XR) 500 MG 24 hr tablet; Take 2 tablets (1,000 mg) by mouth 2 times daily (with meals) 1 tab daily x 1 week, then increase to 2 tabs  x 1 week, then increase to 2 tabs twice daily  Dispense: 360 tablet; Refill: 3  - blood glucose (NO BRAND SPECIFIED) test strip; Use to test blood sugar 1 time daily or as directed.  Dispense: 100 strip; Refill: 3  - blood glucose (NO BRAND SPECIFIED) lancets standard; Use to test blood sugar 1 time daily or as directed.  Dispense: 100 each; Refill: 3  - blood glucose monitoring (NO BRAND SPECIFIED) meter device kit; Use to test blood sugar 1 times daily or as directed.  Dispense: 1 kit; Refill: 0    4. Trichomonas infection  Originally tested positive 3 weeks ago.  I prescribed her metronidazole 1 dose and she did take that as directed.  Test for cure today shows she still does have trichomonas.  I will send her chart message with this information.  Try metronidazole 500 mg twice daily for 7 days, which is actually the recommended first-line treatment for trichomonas.  She has a physical with me in about 1 month.  Can recheck at that time, sooner if concerns.  - Wet preparation  - Chlamydia trachomatis/Neisseria gonorrhoeae by PCR  - metroNIDAZOLE (FLAGYL) 500 MG tablet; Take 1 tablet (500 mg) by mouth 2 times daily for 7 days  Dispense:  14 tablet; Refill: 0        This dictation uses voice recognition software, which may contain typographical errors.

## 2023-10-29 ASSESSMENT — ENCOUNTER SYMPTOMS
PALPITATIONS: 0
PARESTHESIAS: 0
COUGH: 0
HEMATOCHEZIA: 0
JOINT SWELLING: 0
HEARTBURN: 0
SORE THROAT: 0
EYE PAIN: 0
HEMATURIA: 0
ARTHRALGIAS: 0
WEAKNESS: 0
FREQUENCY: 0
CHILLS: 0
DYSURIA: 0
NERVOUS/ANXIOUS: 0
NAUSEA: 0
HEADACHES: 0
MYALGIAS: 0
BREAST MASS: 0
ABDOMINAL PAIN: 0
SHORTNESS OF BREATH: 0
DIARRHEA: 0
CONSTIPATION: 0
DIZZINESS: 0
FEVER: 0

## 2023-10-30 ENCOUNTER — OFFICE VISIT (OUTPATIENT)
Dept: FAMILY MEDICINE | Facility: CLINIC | Age: 28
End: 2023-10-30
Payer: COMMERCIAL

## 2023-10-30 VITALS
SYSTOLIC BLOOD PRESSURE: 123 MMHG | WEIGHT: 242 LBS | HEIGHT: 66 IN | TEMPERATURE: 97.6 F | BODY MASS INDEX: 38.89 KG/M2 | RESPIRATION RATE: 16 BRPM | OXYGEN SATURATION: 100 % | DIASTOLIC BLOOD PRESSURE: 78 MMHG | HEART RATE: 52 BPM

## 2023-10-30 DIAGNOSIS — E11.65 TYPE 2 DIABETES MELLITUS WITH HYPERGLYCEMIA, WITH LONG-TERM CURRENT USE OF INSULIN (H): ICD-10-CM

## 2023-10-30 DIAGNOSIS — Z79.4 TYPE 2 DIABETES MELLITUS WITH HYPERGLYCEMIA, WITH LONG-TERM CURRENT USE OF INSULIN (H): ICD-10-CM

## 2023-10-30 DIAGNOSIS — N89.8 VAGINAL IRRITATION: ICD-10-CM

## 2023-10-30 DIAGNOSIS — Z00.00 ANNUAL PHYSICAL EXAM: Primary | ICD-10-CM

## 2023-10-30 LAB
CLUE CELLS: ABNORMAL
TRICHOMONAS, WET PREP: ABNORMAL
WBC'S/HIGH POWER FIELD, WET PREP: ABNORMAL
YEAST, WET PREP: ABNORMAL

## 2023-10-30 PROCEDURE — 87591 N.GONORRHOEAE DNA AMP PROB: CPT | Performed by: PHYSICIAN ASSISTANT

## 2023-10-30 PROCEDURE — 99213 OFFICE O/P EST LOW 20 MIN: CPT | Mod: 25 | Performed by: PHYSICIAN ASSISTANT

## 2023-10-30 PROCEDURE — 87210 SMEAR WET MOUNT SALINE/INK: CPT | Performed by: PHYSICIAN ASSISTANT

## 2023-10-30 PROCEDURE — 99207 PR FOOT EXAM NO CHARGE: CPT | Performed by: PHYSICIAN ASSISTANT

## 2023-10-30 PROCEDURE — 87491 CHLMYD TRACH DNA AMP PROBE: CPT | Performed by: PHYSICIAN ASSISTANT

## 2023-10-30 PROCEDURE — 99395 PREV VISIT EST AGE 18-39: CPT | Performed by: PHYSICIAN ASSISTANT

## 2023-10-30 RX ORDER — INSULIN GLARGINE 100 [IU]/ML
20 INJECTION, SOLUTION SUBCUTANEOUS AT BEDTIME
Qty: 15 ML | Refills: 3 | Status: SHIPPED | OUTPATIENT
Start: 2023-10-30 | End: 2024-04-04

## 2023-10-30 RX ORDER — FLUCONAZOLE 150 MG/1
150 TABLET ORAL ONCE
Qty: 2 TABLET | Refills: 0 | Status: SHIPPED | OUTPATIENT
Start: 2023-10-30 | End: 2023-10-30

## 2023-10-30 NOTE — PATIENT INSTRUCTIONS
If you are able, increase your metformin to 2 pills in the morning and 2 pills in the evening.    I sent the insulin prescription to your pharmacy.  20 Units at bedtime.  Let us know if you are having any problems with that.    Come in for a lab-only visit to recheck your blood sugars in 3 months (end of January).

## 2023-10-30 NOTE — PROGRESS NOTES
SUBJECTIVE    Ava Garcia is a 28 year old female who presents for an annual exam.    Other concerns today:  1.  Diabetes check  A1c was 14 at the beginning of September.  At that visit I had planned to start her on to restart her on 20 units of Lantus daily.  Message was sent to her.  I thought she had insulin at home to start this but she apparently did not.  Therefore she has not started any insulin yet.  She is also only taking 1 pill of metformin twice a day.    2.  Vaginal irritation and trichomonas recheck.  He tested positive for BV and trichomonas on 9/1/2023, treated with metronidazole oral.  She came in for test for cure on 9/18/2023.  BV had gone, but trichomonas was still there.  Metronidazole dose adjusted and resent.  Patient verifies she took that as well.  She is continuing to have some vaginal irritation now.  We will recheck that today.      Patient Active Problem List    Diagnosis Date Noted    Nexplanon insertion (9/18/23) 07/16/2019     Priority: Medium    Morbid obesity (H) 05/23/2019     Priority: Medium    Type 2 diabetes mellitus with hyperglycemia, with long-term current use of insulin (H) 08/07/2017     Priority: Medium    Vitamin D deficiency 08/07/2017     Priority: Medium        Immunization History   Administered Date(s) Administered    DTAP (<7y) 02/05/1997, 04/29/1997, 01/23/1998, 12/11/1998, 07/05/2001    FLU 6-35 months 11/13/2006, 10/22/2013    Flu, Unspecified 12/05/2001, 01/10/2002, 11/11/2003, 11/09/2007, 10/28/2008, 12/14/2011, 10/01/2020    HEPATITIS A (PEDS 12M-18Y) 01/27/2011, 12/14/2011, 09/12/2013    HPV Quadrivalent 07/16/2008, 09/16/2008, 02/12/2009    HepA, Unspecified 01/27/2011, 09/12/2013    Hepatitis B, Peds 1996, 05/19/1997, 10/23/1997    Hib, Unspecified 1996, 02/05/1997, 04/29/1997    Influenza (IIV3) PF 12/05/2001, 01/10/2002, 11/11/2003, 11/09/2007, 10/28/2008, 01/27/2011, 12/14/2011    Influenza Intranasal Vaccine 12/18/2009, 03/15/2013     Influenza Vaccine >6 months (Alfuria,Fluzone) 10/18/2018, 2019    MMR 1998, 2001    Mantoux Tuberculin Skin Test 07/10/2001    Meningococcal ACWY (Menactra ) 10/28/2008, 03/15/2013    Nasal Influenza Vaccine 2-49 (FluMist) 2014    OPV, trivalent, live 1996, 1997, 1997    Pneumo Conj 13-V (2010&after) 2015    Pneumococcal 23 valent 2019    Poliovirus, inactivated (IPV) 2001    Rubella Immunity: Titer/md Dx 10/21/2018    TDAP (Adacel,Boostrix) 10/28/2008, 2019    Varicella 10/23/1997, 10/28/2008       Patient's last menstrual period was 2023.  OB History    Para Term  AB Living   1 0 0 0 0 0   SAB IAB Ectopic Multiple Live Births   0 0 0 0 0      # Outcome Date GA Lbr Jorge/2nd Weight Sex Delivery Anes PTL Lv   1                 Current Outpatient Medications   Medication    blood glucose (NO BRAND SPECIFIED) lancets standard    blood glucose (NO BRAND SPECIFIED) test strip    blood glucose monitoring (NO BRAND SPECIFIED) meter device kit    etonogestrel (NEXPLANON) 68 MG IMPL    insulin glargine (LANTUS SOLOSTAR) 100 UNIT/ML pen    metFORMIN (GLUCOPHAGE XR) 500 MG 24 hr tablet    vitamin D2 (ERGOCALCIFEROL) 43376 units (1250 mcg) capsule    insulin aspart (NOVOLOG FLEXPEN) 100 UNIT/ML pen     Current Facility-Administered Medications   Medication    etonogestrel (NEXPLANON) subdermal implant 68 mg       Past Medical History:   Diagnosis Date    Chlamydia infection 2020    Diabetes mellitus (H)     Gestational    Trichomonas infection 2019    Urinary tract infection 2019       Past Surgical History:   Procedure Laterality Date    BUNIONECTOMY  , 2015    x2    LAPAROSCOPIC CHOLECYSTECTOMY N/A 2019    Procedure: CHOLECYSTECTOMY, LAPAROSCOPIC;  Surgeon: Cristobal Lipscomb MD;  Location: [facility];  Service: General        Family History   Problem Relation Age of Onset    Polycystic ovary syndrome  Mother     Alcoholism Father     No Known Problems Sister     No Known Problems Brother               10/30/2023     8:35 AM   Additional Questions   Roomed by gary   Accompanied by son     Healthy Habits:     Getting at least 3 servings of Calcium per day:  Yes    Bi-annual eye exam:  Yes    Dental care twice a year:  Yes    Sleep apnea or symptoms of sleep apnea:  None    Diet:  Regular (no restrictions)    Frequency of exercise:  4-5 days/week    Duration of exercise:  Less than 15 minutes    Taking medications regularly:  Yes    Medication side effects:  Not applicable    Additional concerns today:  Yes    Social History     Tobacco Use    Smoking status: Never    Smokeless tobacco: Never   Substance Use Topics    Alcohol use: Not Currently         10/29/2023    12:19 PM   Alcohol Use   Prescreen: >3 drinks/day or >7 drinks/week? No     Reviewed orders with patient.  Reviewed health maintenance and updated orders accordingly - Yes        10/30/2023     8:23 AM   Breast CA Risk Assessment (FHS-7)   Do you have a family history of breast, colon, or ovarian cancer? No / Unknown         9/7/2021     9:35 AM   PAP / HPV   PAP Negative for Intraepithelial Lesion or Malignancy (NILM)      Reviewed and updated as needed this visit by clinical staff   Tobacco  Allergies  Meds              Reviewed and updated as needed this visit by Provider                   Review of Systems   Constitutional:  Negative for chills and fever.   HENT:  Negative for congestion, ear pain, hearing loss and sore throat.    Eyes:  Negative for pain and visual disturbance.   Respiratory:  Negative for cough and shortness of breath.    Cardiovascular:  Negative for chest pain, palpitations and peripheral edema.   Gastrointestinal:  Negative for abdominal pain, constipation, diarrhea, heartburn, hematochezia and nausea.   Breasts:  Negative for tenderness, breast mass and discharge.   Genitourinary:  Positive for genital sores. Negative for  "dysuria, frequency, hematuria, pelvic pain, urgency, vaginal bleeding and vaginal discharge.   Musculoskeletal:  Negative for arthralgias, joint swelling and myalgias.   Skin:  Positive for rash.   Neurological:  Negative for dizziness, weakness, headaches and paresthesias.   Psychiatric/Behavioral:  Negative for mood changes. The patient is not nervous/anxious.          OBJECTIVE    Vitals:    10/30/23 0836   BP: 123/78   BP Location: Left arm   Patient Position: Sitting   Cuff Size: Adult Large   Pulse: 52   Resp: 16   Temp: 97.6  F (36.4  C)   TempSrc: Temporal   SpO2: 100%   Weight: 109.8 kg (242 lb)   Height: 1.676 m (5' 6\")       Body mass index is 39.06 kg/m .    Physical Exam:  General: patient is alert and oriented x 3, in no apparent distress  HEENT, Thyroid, Lymphatic, Cardiac, Pulmonary, GI, Musculoskeletal, Skin, and Neuro exams were completed today and grossly normal  Breast exam: Deferred  Genitourinary: Deferred    Today's labs pending.  Patient did self collect wet prep.      ASSESSMENT and PLAN    1. Annual physical exam  Health maintenance is discussed with patient as appropriate for age and risk factors.  She is up-to-date on her Pap test.  STD screening completed today.  She declines flu or COVID-vaccine.  - Wet preparation  - Chlamydia trachomatis/Neisseria gonorrhoeae by PCR    2. Type 2 diabetes mellitus with hyperglycemia, with long-term current use of insulin (H)  Chronic, uncontrolled.  There has been a delay in getting her to restart insulin.  Lantus sent today for her to start at 20 units nightly.  She continues to check her sugars.  She is now taking metformin 1 pill twice daily.  Recommended she try to increase this to maximum dose of 2 pills twice a day.  Let us know if she has any side effects from metformin with increased dose.  Plan on having her come back for lab only visit in about 3-4 months, sooner if concerns.  A1c: 14% on 9/1/2023  Eye Exam: Up-to-date, done 6/1/2023 at [facility]  Foot Exam: Done today, normal  Smoking: No  On a statin: No, due to age  Blood Pressure: Well controlled  Microalbumin: Up-to-date  On ACE inhibitor: No, due to age  - FOOT EXAM  - insulin glargine (LANTUS SOLOSTAR) 100 UNIT/ML pen; Inject 20 Units Subcutaneous at bedtime 20 units at bedtime to start with metformin  Dispense: 15 mL; Refill: 3    3. Vaginal irritation  History of trichomonas that failed initial treatment.  She was treated again with a slightly different dose of metronidazole about 1 month ago.  Check again today test for cure.  We also reviewed that if she has uncontrolled blood sugars, she could likely have a semichronic vaginal yeast infection as well.        This dictation uses voice recognition software, which may contain typographical errors.

## 2023-10-31 LAB
C TRACH DNA SPEC QL PROBE+SIG AMP: NEGATIVE
N GONORRHOEA DNA SPEC QL NAA+PROBE: NEGATIVE

## 2024-03-02 ENCOUNTER — HEALTH MAINTENANCE LETTER (OUTPATIENT)
Age: 29
End: 2024-03-02

## 2024-03-26 ENCOUNTER — OFFICE VISIT (OUTPATIENT)
Dept: FAMILY MEDICINE | Facility: CLINIC | Age: 29
End: 2024-03-26
Attending: PHYSICIAN ASSISTANT
Payer: COMMERCIAL

## 2024-03-26 VITALS
HEIGHT: 66 IN | HEART RATE: 79 BPM | OXYGEN SATURATION: 100 % | RESPIRATION RATE: 16 BRPM | BODY MASS INDEX: 38.09 KG/M2 | TEMPERATURE: 97.6 F | DIASTOLIC BLOOD PRESSURE: 79 MMHG | WEIGHT: 237 LBS | SYSTOLIC BLOOD PRESSURE: 129 MMHG

## 2024-03-26 DIAGNOSIS — Z11.3 SCREEN FOR STD (SEXUALLY TRANSMITTED DISEASE): Primary | ICD-10-CM

## 2024-03-26 DIAGNOSIS — E11.65 TYPE 2 DIABETES MELLITUS WITH HYPERGLYCEMIA, WITH LONG-TERM CURRENT USE OF INSULIN (H): ICD-10-CM

## 2024-03-26 DIAGNOSIS — Z79.4 TYPE 2 DIABETES MELLITUS WITH HYPERGLYCEMIA, WITH LONG-TERM CURRENT USE OF INSULIN (H): ICD-10-CM

## 2024-03-26 LAB
CLUE CELLS: ABNORMAL
HBA1C MFR BLD: 12.4 % (ref 0–5.6)
TRICHOMONAS, WET PREP: ABNORMAL
WBC'S/HIGH POWER FIELD, WET PREP: ABNORMAL
YEAST, WET PREP: ABNORMAL

## 2024-03-26 PROCEDURE — 87210 SMEAR WET MOUNT SALINE/INK: CPT | Performed by: PHYSICIAN ASSISTANT

## 2024-03-26 PROCEDURE — 87491 CHLMYD TRACH DNA AMP PROBE: CPT | Performed by: PHYSICIAN ASSISTANT

## 2024-03-26 PROCEDURE — 86803 HEPATITIS C AB TEST: CPT | Performed by: PHYSICIAN ASSISTANT

## 2024-03-26 PROCEDURE — 87591 N.GONORRHOEAE DNA AMP PROB: CPT | Performed by: PHYSICIAN ASSISTANT

## 2024-03-26 PROCEDURE — 87389 HIV-1 AG W/HIV-1&-2 AB AG IA: CPT | Performed by: PHYSICIAN ASSISTANT

## 2024-03-26 PROCEDURE — 86780 TREPONEMA PALLIDUM: CPT | Performed by: PHYSICIAN ASSISTANT

## 2024-03-26 PROCEDURE — 83036 HEMOGLOBIN GLYCOSYLATED A1C: CPT | Performed by: PHYSICIAN ASSISTANT

## 2024-03-26 PROCEDURE — 99213 OFFICE O/P EST LOW 20 MIN: CPT | Performed by: PHYSICIAN ASSISTANT

## 2024-03-26 PROCEDURE — 36415 COLL VENOUS BLD VENIPUNCTURE: CPT | Performed by: PHYSICIAN ASSISTANT

## 2024-03-26 NOTE — PROGRESS NOTES
Subjective:      Ava Garcia is a 28 year old female with chief complaint of STD screen.    1.  STD screen  Has a new partner.  No symptoms.  Like would like STD screening today.    2.  Type 2 diabetes.  This was not the primary focus of her visit today.  Historically she has had poorly controlled diabetes.  Missed diabetic education appointments in the past.  She is now taking metformin 2 pills twice a day.  Reports her sugars are generally good, in the low 100s.  Checks her sugars about twice a day, fasting in the morning and then before bed.  She is also been working on healthy eating and exercise, has lost some weight since her last visit.  Was never able to  Lantus, due to pharmacy issues.  Has Nexplanon for contraception.        Diabetes:   She presents for follow up of diabetes.  She is checking home blood glucose a few times a month.   She checks blood glucose at bedtime.  Blood glucose is never over 200 and never under 70. She is aware of hypoglycemia symptoms including dizziness.    She has no concerns regarding her diabetes at this time.   She is not experiencing numbness or burning in feet, excessive thirst, blurry vision, weight changes or redness, sores or blisters on feet.       Patient Active Problem List   Diagnosis    Type 2 diabetes mellitus with hyperglycemia, with long-term current use of insulin (H)    Vitamin D deficiency    Morbid obesity (H)    Nexplanon insertion (9/18/23)       Current Outpatient Medications:     blood glucose (NO BRAND SPECIFIED) lancets standard, Use to test blood sugar 1 time daily or as directed., Disp: 100 each, Rfl: 3    blood glucose (NO BRAND SPECIFIED) test strip, Use to test blood sugar 1 time daily or as directed., Disp: 100 strip, Rfl: 3    blood glucose monitoring (NO BRAND SPECIFIED) meter device kit, Use to test blood sugar 1 times daily or as directed., Disp: 1 kit, Rfl: 0    etonogestrel (NEXPLANON) 68 MG IMPL, 1 each (68 mg) by Subdermal route  "once, Disp: , Rfl:     insulin aspart (NOVOLOG FLEXPEN) 100 UNIT/ML pen, Take 5 units before each meal., Disp: 15 mL, Rfl: 3    insulin glargine (LANTUS SOLOSTAR) 100 UNIT/ML pen, Inject 20 Units Subcutaneous at bedtime 20 units at bedtime to start with metformin, Disp: 15 mL, Rfl: 3    metFORMIN (GLUCOPHAGE XR) 500 MG 24 hr tablet, Take 2 tablets (1,000 mg) by mouth 2 times daily (with meals) 1 tab daily x 1 week, then increase to 2 tabs  x 1 week, then increase to 2 tabs twice daily, Disp: 360 tablet, Rfl: 3    vitamin D2 (ERGOCALCIFEROL) 16886 units (1250 mcg) capsule, Take 1 capsule (50,000 Units) by mouth once a week, Disp: 12 capsule, Rfl: 3    Current Facility-Administered Medications:     etonogestrel (NEXPLANON) subdermal implant 68 mg, 1 each, Subdermal, Once, Anna Kim PA-C     Objective:     Allergies:  Patient has no known allergies.    Vitals:  /79 (BP Location: Right arm, Patient Position: Sitting, Cuff Size: Adult Large)   Pulse 79   Temp 97.6  F (36.4  C) (Temporal)   Resp 16   Ht 1.676 m (5' 6\")   Wt 107.5 kg (237 lb)   SpO2 100%   BMI 38.25 kg/m    Body mass index is 38.25 kg/m .    Vital signs reviewed.  General: Patient is alert and oriented x 3, in no apparent distress  : Patient declined exam    Today's labs pending.    Assessment and Plan:   1. Screen for STD (sexually transmitted disease)  Patient would like STD screening.  New partner.  No symptoms.  I will follow-up with results.  - Wet preparation  - Chlamydia trachomatis/Neisseria gonorrhoeae by PCR  - HIV Antigen Antibody Combo; Future  - Treponema Abs w Reflex to RPR and Titer; Future  - Hepatitis C antibody; Future    2. Type 2 diabetes mellitus with hyperglycemia, with long-term current use of insulin (H)  Has historically had poorly controlled blood sugars.  Last A1c from 9/1/2023 = 14%.  She is now taking metformin, 2 pills twice a day.  Never started Lantus due to trouble getting it at the " pharmacy.  She reports that her blood sugars at home are in the low 100s.  Usually checks her sugars twice a day.  A1c drawn today and I will follow-up with results.  She has also lost some weight through healthy eating and exercise.  I congratulated her on this.  - Hemoglobin A1c    3.  Healthcare maintenance  Reviewed that she is due for a Pap smear in September.  She will schedule a visit sometime this summer or fall to get that done.    This dictation uses voice recognition software, which may contain typographical errors.

## 2024-04-04 ENCOUNTER — TELEPHONE (OUTPATIENT)
Dept: FAMILY MEDICINE | Facility: CLINIC | Age: 29
End: 2024-04-04

## 2024-04-04 ENCOUNTER — MYC MEDICAL ADVICE (OUTPATIENT)
Dept: FAMILY MEDICINE | Facility: CLINIC | Age: 29
End: 2024-04-04
Payer: COMMERCIAL

## 2024-04-04 DIAGNOSIS — E11.65 TYPE 2 DIABETES MELLITUS WITH HYPERGLYCEMIA, WITH LONG-TERM CURRENT USE OF INSULIN (H): Primary | ICD-10-CM

## 2024-04-04 DIAGNOSIS — Z79.4 TYPE 2 DIABETES MELLITUS WITH HYPERGLYCEMIA, WITH LONG-TERM CURRENT USE OF INSULIN (H): Primary | ICD-10-CM

## 2024-04-04 RX ORDER — INSULIN GLARGINE 100 [IU]/ML
20 INJECTION, SOLUTION SUBCUTANEOUS AT BEDTIME
Qty: 15 ML | Refills: 3 | Status: SHIPPED | OUTPATIENT
Start: 2024-04-04

## 2024-04-04 RX ORDER — INSULIN ASPART 100 [IU]/ML
INJECTION, SOLUTION INTRAVENOUS; SUBCUTANEOUS
Qty: 15 ML | Refills: 3 | Status: SHIPPED | OUTPATIENT
Start: 2024-04-04

## 2024-04-04 NOTE — TELEPHONE ENCOUNTER
Can someone please call her pharmacy and see what the problem is with her picking up her insulin?  I re-sent it a few times and she continues to say she cannot get it picked up when she goes there.  If it is unclear what is going on, could forward this to our pharmacist to investigate.

## 2024-04-08 NOTE — TELEPHONE ENCOUNTER
"Contacted Mark. Patient has already picked up Novolog, Lantus is being prepared for pick-up later today.    Patient is interested in trying another medication to help diabetes and weight, as discussed in A1C result message from 4/4/24:    \"Another option would be to add another medicine.  We could pick a medicine that should help with both diabetes and maybe also with weight loss.  Let me know if you are interested in this.\"  "

## 2024-04-29 NOTE — LETTER
April 29, 2024  Re: Ava Garcia  YOB: 1995    Dear Colleague,    Thank you for your referral to the [facility]. We have been unable to schedule the referral after several contact attempts.      If you have any questions or concerns, please contact our office at Dept: [phone].        Sincerely,  [facility]

## 2024-09-15 ENCOUNTER — MYC MEDICAL ADVICE (OUTPATIENT)
Dept: FAMILY MEDICINE | Facility: CLINIC | Age: 29
End: 2024-09-15
Payer: COMMERCIAL

## 2024-09-15 DIAGNOSIS — E11.65 TYPE 2 DIABETES MELLITUS WITH HYPERGLYCEMIA, WITH LONG-TERM CURRENT USE OF INSULIN (H): Primary | ICD-10-CM

## 2024-09-15 DIAGNOSIS — Z79.4 TYPE 2 DIABETES MELLITUS WITH HYPERGLYCEMIA, WITH LONG-TERM CURRENT USE OF INSULIN (H): Primary | ICD-10-CM

## 2024-09-17 NOTE — TELEPHONE ENCOUNTER
Anna,     Pt's Ozempic 2mg/3ml experied ended 6/16/24. Please order more refill.     Delmy GU RN  [facility]

## 2024-09-28 ENCOUNTER — HEALTH MAINTENANCE LETTER (OUTPATIENT)
Age: 29
End: 2024-09-28

## 2024-09-30 ENCOUNTER — PATIENT OUTREACH (OUTPATIENT)
Dept: CARE COORDINATION | Facility: CLINIC | Age: 29
End: 2024-09-30
Payer: COMMERCIAL

## 2024-10-14 ENCOUNTER — PATIENT OUTREACH (OUTPATIENT)
Dept: CARE COORDINATION | Facility: CLINIC | Age: 29
End: 2024-10-14
Payer: COMMERCIAL

## 2024-12-07 ENCOUNTER — HEALTH MAINTENANCE LETTER (OUTPATIENT)
Age: 29
End: 2024-12-07

## 2025-04-05 ENCOUNTER — HEALTH MAINTENANCE LETTER (OUTPATIENT)
Age: 30
End: 2025-04-05